# Patient Record
Sex: FEMALE | Race: OTHER | HISPANIC OR LATINO | URBAN - METROPOLITAN AREA
[De-identification: names, ages, dates, MRNs, and addresses within clinical notes are randomized per-mention and may not be internally consistent; named-entity substitution may affect disease eponyms.]

---

## 2020-12-09 ENCOUNTER — EMERGENCY (EMERGENCY)
Facility: HOSPITAL | Age: 35
LOS: 1 days | Discharge: HOME | End: 2020-12-09
Attending: EMERGENCY MEDICINE | Admitting: EMERGENCY MEDICINE
Payer: MEDICAID

## 2020-12-09 VITALS
RESPIRATION RATE: 18 BRPM | SYSTOLIC BLOOD PRESSURE: 101 MMHG | HEART RATE: 71 BPM | DIASTOLIC BLOOD PRESSURE: 56 MMHG | OXYGEN SATURATION: 95 % | TEMPERATURE: 98 F

## 2020-12-09 LAB
ADD ON TEST-SPECIMEN IN LAB: SIGNIFICANT CHANGE UP
ALBUMIN SERPL ELPH-MCNC: 4.4 G/DL — SIGNIFICANT CHANGE UP (ref 3.3–5)
ALP SERPL-CCNC: 72 U/L — SIGNIFICANT CHANGE UP (ref 40–120)
ALT FLD-CCNC: 17 U/L — SIGNIFICANT CHANGE UP (ref 4–33)
ANION GAP SERPL CALC-SCNC: 10 MMOL/L — SIGNIFICANT CHANGE UP (ref 7–14)
AST SERPL-CCNC: 18 U/L — SIGNIFICANT CHANGE UP (ref 4–32)
BASOPHILS # BLD AUTO: 0.01 K/UL — SIGNIFICANT CHANGE UP (ref 0–0.2)
BASOPHILS NFR BLD AUTO: 0.2 % — SIGNIFICANT CHANGE UP (ref 0–2)
BILIRUB SERPL-MCNC: 0.4 MG/DL — SIGNIFICANT CHANGE UP (ref 0.2–1.2)
BUN SERPL-MCNC: 10 MG/DL — SIGNIFICANT CHANGE UP (ref 7–23)
CALCIUM SERPL-MCNC: 9.5 MG/DL — SIGNIFICANT CHANGE UP (ref 8.4–10.5)
CHLORIDE SERPL-SCNC: 101 MMOL/L — SIGNIFICANT CHANGE UP (ref 98–107)
CO2 SERPL-SCNC: 25 MMOL/L — SIGNIFICANT CHANGE UP (ref 22–31)
CREAT SERPL-MCNC: 0.58 MG/DL — SIGNIFICANT CHANGE UP (ref 0.5–1.3)
EOSINOPHIL # BLD AUTO: 0.1 K/UL — SIGNIFICANT CHANGE UP (ref 0–0.5)
EOSINOPHIL NFR BLD AUTO: 1.6 % — SIGNIFICANT CHANGE UP (ref 0–6)
GLUCOSE SERPL-MCNC: 89 MG/DL — SIGNIFICANT CHANGE UP (ref 70–99)
HCT VFR BLD CALC: 38.4 % — SIGNIFICANT CHANGE UP (ref 34.5–45)
HGB BLD-MCNC: 12.7 G/DL — SIGNIFICANT CHANGE UP (ref 11.5–15.5)
IANC: 3.53 K/UL — SIGNIFICANT CHANGE UP (ref 1.5–8.5)
IMM GRANULOCYTES NFR BLD AUTO: 0.6 % — SIGNIFICANT CHANGE UP (ref 0–1.5)
LYMPHOCYTES # BLD AUTO: 1.9 K/UL — SIGNIFICANT CHANGE UP (ref 1–3.3)
LYMPHOCYTES # BLD AUTO: 30.5 % — SIGNIFICANT CHANGE UP (ref 13–44)
MCHC RBC-ENTMCNC: 28.7 PG — SIGNIFICANT CHANGE UP (ref 27–34)
MCHC RBC-ENTMCNC: 33.1 GM/DL — SIGNIFICANT CHANGE UP (ref 32–36)
MCV RBC AUTO: 86.7 FL — SIGNIFICANT CHANGE UP (ref 80–100)
MONOCYTES # BLD AUTO: 0.64 K/UL — SIGNIFICANT CHANGE UP (ref 0–0.9)
MONOCYTES NFR BLD AUTO: 10.3 % — SIGNIFICANT CHANGE UP (ref 2–14)
NEUTROPHILS # BLD AUTO: 3.53 K/UL — SIGNIFICANT CHANGE UP (ref 1.8–7.4)
NEUTROPHILS NFR BLD AUTO: 56.8 % — SIGNIFICANT CHANGE UP (ref 43–77)
NRBC # BLD: 0 /100 WBCS — SIGNIFICANT CHANGE UP
NRBC # FLD: 0 K/UL — SIGNIFICANT CHANGE UP
PLATELET # BLD AUTO: 232 K/UL — SIGNIFICANT CHANGE UP (ref 150–400)
POTASSIUM SERPL-MCNC: 4.3 MMOL/L — SIGNIFICANT CHANGE UP (ref 3.5–5.3)
POTASSIUM SERPL-SCNC: 4.3 MMOL/L — SIGNIFICANT CHANGE UP (ref 3.5–5.3)
PROT SERPL-MCNC: 7.8 G/DL — SIGNIFICANT CHANGE UP (ref 6–8.3)
RBC # BLD: 4.43 M/UL — SIGNIFICANT CHANGE UP (ref 3.8–5.2)
RBC # FLD: 13.3 % — SIGNIFICANT CHANGE UP (ref 10.3–14.5)
SARS-COV-2 RNA SPEC QL NAA+PROBE: SIGNIFICANT CHANGE UP
SODIUM SERPL-SCNC: 136 MMOL/L — SIGNIFICANT CHANGE UP (ref 135–145)
TROPONIN T, HIGH SENSITIVITY RESULT: <6 NG/L — SIGNIFICANT CHANGE UP
WBC # BLD: 6.22 K/UL — SIGNIFICANT CHANGE UP (ref 3.8–10.5)
WBC # FLD AUTO: 6.22 K/UL — SIGNIFICANT CHANGE UP (ref 3.8–10.5)

## 2020-12-09 PROCEDURE — 71046 X-RAY EXAM CHEST 2 VIEWS: CPT | Mod: 26

## 2020-12-09 PROCEDURE — 99284 EMERGENCY DEPT VISIT MOD MDM: CPT

## 2020-12-09 RX ORDER — FAMOTIDINE 10 MG/ML
20 INJECTION INTRAVENOUS ONCE
Refills: 0 | Status: COMPLETED | OUTPATIENT
Start: 2020-12-09 | End: 2020-12-09

## 2020-12-09 RX ORDER — FAMOTIDINE 10 MG/ML
20 INJECTION INTRAVENOUS ONCE
Refills: 0 | Status: DISCONTINUED | OUTPATIENT
Start: 2020-12-09 | End: 2020-12-09

## 2020-12-09 RX ORDER — SODIUM CHLORIDE 9 MG/ML
1000 INJECTION INTRAMUSCULAR; INTRAVENOUS; SUBCUTANEOUS ONCE
Refills: 0 | Status: COMPLETED | OUTPATIENT
Start: 2020-12-09 | End: 2020-12-09

## 2020-12-09 RX ORDER — ONDANSETRON 8 MG/1
4 TABLET, FILM COATED ORAL ONCE
Refills: 0 | Status: COMPLETED | OUTPATIENT
Start: 2020-12-09 | End: 2020-12-09

## 2020-12-09 RX ADMIN — SODIUM CHLORIDE 1000 MILLILITER(S): 9 INJECTION INTRAMUSCULAR; INTRAVENOUS; SUBCUTANEOUS at 13:24

## 2020-12-09 RX ADMIN — FAMOTIDINE 20 MILLIGRAM(S): 10 INJECTION INTRAVENOUS at 13:36

## 2020-12-09 RX ADMIN — ONDANSETRON 4 MILLIGRAM(S): 8 TABLET, FILM COATED ORAL at 13:36

## 2020-12-09 NOTE — ED PROVIDER NOTE - NSFOLLOWUPINSTRUCTIONS_ED_ALL_ED_FT
Rest, drink plenty of fluids.  Advance activity as tolerated.  Continue all previously prescribed medications as directed.  Follow up with your primary care physician in 48-72 hours- bring copies of your results.  Return to the ER for worsening or persistent symptoms, and/or ANY NEW OR CONCERNING SYMPTOMS. If you have issues obtaining follow up, please call: [phone] to obtain a doctor or specialist who takes your insurance in your area.  You may call [phone] to make an appointment with the internal medicine clinic.

## 2020-12-09 NOTE — ED PROVIDER NOTE - PHYSICAL EXAMINATION
GEN - NAD; well appearing; A+O x3   HEAD - NC/AT   EYES- PERRL, EOMI  ENT: Airway patent, mmm, Oral cavity and pharynx normal. No inflammation, swelling, exudate, or lesions.    NECK: Neck supple, non-tender without lymphadenopathy, no masses.  PULMONARY - CTA b/l, symmetric breath sounds.   CARDIAC -s1s2, RRR, no M,G,R  ABDOMEN - +BS, ND, NT, soft, no guarding, no rebound, no masses   BACK - no CVA tenderness, Normal  spine   EXTREMITIES - FROM,  no edema   SKIN - no rash or bruising   NEUROLOGIC - alert, speech clear, no focal deficits  PSYCH -nl mood/affect, nl insight.

## 2020-12-09 NOTE — ED PROVIDER NOTE - PROGRESS NOTE DETAILS
Pt advised that she possibly has covid- advised to quarantine for 2 weeks from symptoms onset and return if symptoms persist or worsen. Pt advised to take tylenol and motrin as need. Pt advised that she possibly has covid- advised to quarantine for 2 weeks from symptoms onset and return if symptoms persist or worsen. Pt advised to take tylenol and motrin as need. all results d/w her and copies given.

## 2020-12-09 NOTE — ED PROVIDER NOTE - PATIENT PORTAL LINK FT
You can access the FollowMyHealth Patient Portal offered by [facility] by registering at the following website: [email or web address]. By joining [employer]’s FollowMyHealth portal, you will also be able to view your health information using other applications (apps) compatible with our system.

## 2020-12-09 NOTE — ED PROVIDER NOTE - NS ED ROS FT
ROS:  GENERAL: No fever, no chills  EYES: no change in vision  HEENT: no trouble swallowing, no trouble speaking  CARDIAC: + chest pain  PULMONARY: no cough, + shortness of breath  GI: + abdominal pain, +nausea, no diarrhea, no constipation  : No dysuria, no frequency, no change in appearance, or odor of urine  SKIN: no rashes  NEURO: + headache now resolved, no weakness  MSK: No joint pain

## 2020-12-09 NOTE — ED PROVIDER NOTE - OBJECTIVE STATEMENT
35 y/o f presents to the ED with myalgias,  nuasea, constant chest and epigastric burning discomfort, sob, with previous ha. Patient states symptoms started approx 6d ago, initially was having generalized ha which has since resolved. Then started having burning pain to abdomen and chest, and sob all the time. Denies fevers, cough, vomiting, diarrhea, dysuria, le edema, recent travel. +rapid covid negative at . lmp 2 w ago

## 2020-12-09 NOTE — ED ADULT TRIAGE NOTE - CHIEF COMPLAINT QUOTE
pt reports HA chest pain abd "burning" since last week. saw PMD with no results. had rapid COVID at urgent care today which was negative. denies fever or chills. denies cough. pt states CP worsens with exertion as well as dry mouth

## 2020-12-09 NOTE — ED PROVIDER NOTE - CLINICAL SUMMARY MEDICAL DECISION MAKING FREE TEXT BOX
Patient presents to the ED with constellation of symptoms most likely c/w viral syndrome-locations, will check labs/cxr/ekg, covid pcr, symptomatic treatment, and reassess.

## 2021-03-24 NOTE — ED ADULT TRIAGE NOTE - BEFAST SPEECH PHRASE
[FreeTextEntry1] : CKD 3-- creat slt above baseline range of 1.2 -1.3 but has been in this range previously - k also up . This is likely du to addition of aldactone but appears to have worked well for BP control (and proteinuria also better controlled) \par reviewed lower K intake and will recheck lytes in one month \par If stable will cont regimen -- otherwise consider lower aldactone dose (likely to qod) and/ or add k binder -- discussed\par f/u 3 mos \par 
Yes

## 2022-10-21 NOTE — ED ADULT TRIAGE NOTE - BP NONINVASIVE DIASTOLIC (MM HG)
MEDICARE WELLNESS VISIT NOTE    HISTORY OF PRESENT ILLNESS:   Cyndee Cruz presents for her Subsequent Annual Medicare Wellness Visit.   She has complaints or concerns which include, please see separate note.      Patient Care Team:  Abi Aguirre DO as PCP - General (Family Practice)        Patient Active Problem List   Diagnosis   • Mixed hyperlipidemia   • Primary hypertension   • Spondylolisthesis   • Chronic acquired lymphedema   • Cystinuria (CMS/HCC)   • Primary osteoarthritis of both hands   • VI (obstructive sleep apnea)   • Mild intermittent asthma without complication   • Lumbar spondylosis         Past Medical History:   Diagnosis Date   • Chest pain 2022    was evaluated in 2022 inpt- mid sternal to right, nonradiating, woke up in pain and lasted 1 hour, no CP since then   • COVID-19 virus detected 2022    had cough, fever   • High cholesterol    • Hypertension    • Kidney disease     GFR 31   • Primary generalized (osteo)arthritis    • Sciatic nerve pain    • Sleep apnea     has CPAP         Past Surgical History:   Procedure Laterality Date   • Appendectomy     • Back surgery     •  delivery+postpartum care     •  section, low transverse     • Joint replacement     • Removal of tonsils,<12 y/o     • Total knee replacement Right          Social History     Tobacco Use   • Smoking status: Never Smoker   • Smokeless tobacco: Never Used   Vaping Use   • Vaping Use: never used   Substance Use Topics   • Alcohol use: Yes     Alcohol/week: 3.0 standard drinks     Types: 3 Glasses of wine per week   • Drug use: Yes     Types: Marijuana     Comment: 4 times weekly     Drug use:    Drug Use:    Yes                Special: Marijuana       Comment: 4 times weekly    Family History   Problem Relation Age of Onset   • Aneurysm Mother    • Stroke Mother    • Diabetes Father    • Myocardial Infarction Father    • Cancer, Lung Sister    • Heart disease Sister    • Heart disease  Sister    • Heart disease Sister    • Heart disease Brother    • Heart disease Sister    • Heart disease Sister        Current Outpatient Medications   Medication Sig Dispense Refill   • scopolamine (TRANSDERM_SCOP) 1 MG/3DAYS patch Place 1 patch onto the skin every 72 hours. 10 patch 0   • Fenofibrate 200 MG Cap TAKE 1 CAPSULE EVERY MORNING 90 capsule 1   • ezetimibe (ZETIA) 10 MG tablet TAKE 1 TABLET EVERY DAY 90 tablet 0   • montelukast (SINGULAIR) 10 MG tablet TAKE 1 TABLET EVERY EVENING 90 tablet 0   • lidocaine (LIDODERM) 5 % patch 1 patch as needed.     • atorvastatin (LIPITOR) 80 MG tablet TAKE 1 TABLET EVERY DAY (Patient taking differently: Take 80 mg by mouth every evening.) 90 tablet 0   • albuterol 108 (90 Base) MCG/ACT inhaler Inhale 2 puffs into the lungs every 4 hours as needed for Shortness of Breath or Wheezing.      • amLODIPine (NORVASC) 5 MG tablet Take 1 tablet by mouth daily. (Patient taking differently: Take 5 mg by mouth every evening.) 90 tablet 3   • Valacyclovir HCl 1000 MG Tab Take 1 tablet by mouth daily. (Patient taking differently: Take 1,000 mg by mouth every evening.) 90 tablet 0   • lisinopril (ZESTRIL) 20 MG tablet Take 20 mg by mouth every morning.      • Multiple Vitamins-Minerals (Multivitamin Women) Tab Take 1 tablet by mouth daily.      • VITAMIN D, CHOLECALCIFEROL, PO Take 1 tablet by mouth daily.      • Ascorbic Acid (vitamin C) 100 MG tablet Take 100 mg by mouth daily.     • Psyllium ([employer] NATURAL FIBER SUPPLEMENT PO) Take 1 capsule by mouth daily.      • TURMERIC PO Take 1 capsule by mouth daily.      • LYSINE PO Take 1 tablet by mouth daily.        No current facility-administered medications for this visit.        The following items on the Medicare Health Risk Assessment were found to be positive  1.) Do you have an Advance directive, living will, or power of  for health care document that contains your wishes for end of life care?: No     2.) Would you like  additional information on advance directives?: Yes     6 b.) How many servings of High Fiber / Whole Grain Foods to you have each day ( 1 serving = 1 cup cold cereal, 1/2 cup cooked cereal, 1 slice bread): 1 per day         Vision and Hearing screens: Both screenings were performed and reviewed    Advance Directive:   The patient has the following documents:  Power of  for Health Care    Cognitive/Functional Status: Mini-Cog performed with score of 5    Opioid Review: Cyndee is not taking opioid medications.    Recent PHQ 2/9 Score:    PHQ 2:  Date Adult PHQ 2 Score Adult PHQ 2 Interpretation   10/21/2022 0 No further screening needed       PHQ 9:       DEPRESSION ASSESSMENT/PLAN:  Depression screening is negative no further plan needed.     Body mass index is 30.46 kg/m².    BMI ASSESSMENT/PLAN:  Patient is overweight.    Journal food intake daily and Join health club        See orders.   See Patient Instructions section.   Return in about 3 months (around 1/21/2023) for recheck.     56

## 2022-12-16 ENCOUNTER — EMERGENCY (EMERGENCY)
Facility: HOSPITAL | Age: 37
LOS: 1 days | Discharge: HOME | End: 2022-12-16
Attending: STUDENT IN AN ORGANIZED HEALTH CARE EDUCATION/TRAINING PROGRAM | Admitting: STUDENT IN AN ORGANIZED HEALTH CARE EDUCATION/TRAINING PROGRAM
Payer: MEDICAID

## 2022-12-16 VITALS
TEMPERATURE: 98 F | HEART RATE: 74 BPM | RESPIRATION RATE: 18 BRPM | SYSTOLIC BLOOD PRESSURE: 100 MMHG | DIASTOLIC BLOOD PRESSURE: 66 MMHG | OXYGEN SATURATION: 98 %

## 2022-12-16 VITALS
HEART RATE: 87 BPM | WEIGHT: 141.1 LBS | DIASTOLIC BLOOD PRESSURE: 77 MMHG | OXYGEN SATURATION: 96 % | HEIGHT: 65 IN | SYSTOLIC BLOOD PRESSURE: 113 MMHG | RESPIRATION RATE: 20 BRPM | TEMPERATURE: 97 F

## 2022-12-16 LAB
ALBUMIN SERPL ELPH-MCNC: 3.7 G/DL — SIGNIFICANT CHANGE UP (ref 3.3–5)
ALP SERPL-CCNC: 97 U/L — SIGNIFICANT CHANGE UP (ref 30–120)
ALT FLD-CCNC: 29 U/L DA — SIGNIFICANT CHANGE UP (ref 10–60)
ANION GAP SERPL CALC-SCNC: 10 MMOL/L — SIGNIFICANT CHANGE UP (ref 5–17)
APPEARANCE UR: CLEAR — SIGNIFICANT CHANGE UP
AST SERPL-CCNC: 27 U/L — SIGNIFICANT CHANGE UP (ref 10–40)
BACTERIA # UR AUTO: NEGATIVE — SIGNIFICANT CHANGE UP
BASOPHILS # BLD AUTO: 0.02 K/UL — SIGNIFICANT CHANGE UP (ref 0–0.2)
BASOPHILS NFR BLD AUTO: 0.2 % — SIGNIFICANT CHANGE UP (ref 0–2)
BILIRUB SERPL-MCNC: 0.4 MG/DL — SIGNIFICANT CHANGE UP (ref 0.2–1.2)
BILIRUB UR-MCNC: ABNORMAL
BUN SERPL-MCNC: 13 MG/DL — SIGNIFICANT CHANGE UP (ref 7–23)
CALCIUM SERPL-MCNC: 8.6 MG/DL — SIGNIFICANT CHANGE UP (ref 8.4–10.5)
CHLORIDE SERPL-SCNC: 103 MMOL/L — SIGNIFICANT CHANGE UP (ref 96–108)
CO2 SERPL-SCNC: 25 MMOL/L — SIGNIFICANT CHANGE UP (ref 22–31)
COLOR SPEC: YELLOW — SIGNIFICANT CHANGE UP
CREAT SERPL-MCNC: 0.68 MG/DL — SIGNIFICANT CHANGE UP (ref 0.5–1.3)
DIFF PNL FLD: ABNORMAL
EGFR: 115 ML/MIN/1.73M2 — SIGNIFICANT CHANGE UP
EOSINOPHIL # BLD AUTO: 0.18 K/UL — SIGNIFICANT CHANGE UP (ref 0–0.5)
EOSINOPHIL NFR BLD AUTO: 2.1 % — SIGNIFICANT CHANGE UP (ref 0–6)
EPI CELLS # UR: SIGNIFICANT CHANGE UP
GLUCOSE SERPL-MCNC: 100 MG/DL — HIGH (ref 70–99)
GLUCOSE UR QL: NEGATIVE MG/DL — SIGNIFICANT CHANGE UP
HCG UR QL: NEGATIVE — SIGNIFICANT CHANGE UP
HCT VFR BLD CALC: 37.9 % — SIGNIFICANT CHANGE UP (ref 34.5–45)
HGB BLD-MCNC: 12.7 G/DL — SIGNIFICANT CHANGE UP (ref 11.5–15.5)
IMM GRANULOCYTES NFR BLD AUTO: 1 % — HIGH (ref 0–0.9)
KETONES UR-MCNC: NEGATIVE — SIGNIFICANT CHANGE UP
LEUKOCYTE ESTERASE UR-ACNC: ABNORMAL
LYMPHOCYTES # BLD AUTO: 2.59 K/UL — SIGNIFICANT CHANGE UP (ref 1–3.3)
LYMPHOCYTES # BLD AUTO: 30 % — SIGNIFICANT CHANGE UP (ref 13–44)
MCHC RBC-ENTMCNC: 29.4 PG — SIGNIFICANT CHANGE UP (ref 27–34)
MCHC RBC-ENTMCNC: 33.5 GM/DL — SIGNIFICANT CHANGE UP (ref 32–36)
MCV RBC AUTO: 87.7 FL — SIGNIFICANT CHANGE UP (ref 80–100)
MONOCYTES # BLD AUTO: 0.91 K/UL — HIGH (ref 0–0.9)
MONOCYTES NFR BLD AUTO: 10.5 % — SIGNIFICANT CHANGE UP (ref 2–14)
NEUTROPHILS # BLD AUTO: 4.84 K/UL — SIGNIFICANT CHANGE UP (ref 1.8–7.4)
NEUTROPHILS NFR BLD AUTO: 56.2 % — SIGNIFICANT CHANGE UP (ref 43–77)
NITRITE UR-MCNC: POSITIVE
NRBC # BLD: 0 /100 WBCS — SIGNIFICANT CHANGE UP (ref 0–0)
PH UR: 6.5 — SIGNIFICANT CHANGE UP (ref 5–8)
PLATELET # BLD AUTO: 170 K/UL — SIGNIFICANT CHANGE UP (ref 150–400)
POTASSIUM SERPL-MCNC: 3.7 MMOL/L — SIGNIFICANT CHANGE UP (ref 3.5–5.3)
POTASSIUM SERPL-SCNC: 3.7 MMOL/L — SIGNIFICANT CHANGE UP (ref 3.5–5.3)
PROT SERPL-MCNC: 7.7 G/DL — SIGNIFICANT CHANGE UP (ref 6–8.3)
PROT UR-MCNC: NEGATIVE MG/DL — SIGNIFICANT CHANGE UP
RBC # BLD: 4.32 M/UL — SIGNIFICANT CHANGE UP (ref 3.8–5.2)
RBC # FLD: 12.8 % — SIGNIFICANT CHANGE UP (ref 10.3–14.5)
RBC CASTS # UR COMP ASSIST: SIGNIFICANT CHANGE UP /HPF (ref 0–4)
SODIUM SERPL-SCNC: 138 MMOL/L — SIGNIFICANT CHANGE UP (ref 135–145)
SP GR SPEC: 1.01 — SIGNIFICANT CHANGE UP (ref 1.01–1.02)
UROBILINOGEN FLD QL: 4 MG/DL
WBC # BLD: 8.63 K/UL — SIGNIFICANT CHANGE UP (ref 3.8–10.5)
WBC # FLD AUTO: 8.63 K/UL — SIGNIFICANT CHANGE UP (ref 3.8–10.5)
WBC UR QL: SIGNIFICANT CHANGE UP

## 2022-12-16 PROCEDURE — 96375 TX/PRO/DX INJ NEW DRUG ADDON: CPT

## 2022-12-16 PROCEDURE — 36415 COLL VENOUS BLD VENIPUNCTURE: CPT

## 2022-12-16 PROCEDURE — 96365 THER/PROPH/DIAG IV INF INIT: CPT

## 2022-12-16 PROCEDURE — 85025 COMPLETE CBC W/AUTO DIFF WBC: CPT

## 2022-12-16 PROCEDURE — 99284 EMERGENCY DEPT VISIT MOD MDM: CPT

## 2022-12-16 PROCEDURE — 80053 COMPREHEN METABOLIC PANEL: CPT

## 2022-12-16 PROCEDURE — 81001 URINALYSIS AUTO W/SCOPE: CPT

## 2022-12-16 PROCEDURE — 87086 URINE CULTURE/COLONY COUNT: CPT

## 2022-12-16 PROCEDURE — 99284 EMERGENCY DEPT VISIT MOD MDM: CPT | Mod: 25

## 2022-12-16 PROCEDURE — 81025 URINE PREGNANCY TEST: CPT

## 2022-12-16 RX ORDER — CEFUROXIME AXETIL 250 MG
1 TABLET ORAL
Qty: 14 | Refills: 0
Start: 2022-12-16 | End: 2022-12-22

## 2022-12-16 RX ORDER — CEFUROXIME AXETIL 250 MG
500 TABLET ORAL ONCE
Refills: 0 | Status: COMPLETED | OUTPATIENT
Start: 2022-12-16 | End: 2022-12-16

## 2022-12-16 RX ORDER — CEFTRIAXONE 500 MG/1
1000 INJECTION, POWDER, FOR SOLUTION INTRAMUSCULAR; INTRAVENOUS ONCE
Refills: 0 | Status: COMPLETED | OUTPATIENT
Start: 2022-12-16 | End: 2022-12-16

## 2022-12-16 RX ORDER — SODIUM CHLORIDE 9 MG/ML
1000 INJECTION INTRAMUSCULAR; INTRAVENOUS; SUBCUTANEOUS ONCE
Refills: 0 | Status: COMPLETED | OUTPATIENT
Start: 2022-12-16 | End: 2022-12-16

## 2022-12-16 RX ORDER — KETOROLAC TROMETHAMINE 30 MG/ML
15 SYRINGE (ML) INJECTION ONCE
Refills: 0 | Status: DISCONTINUED | OUTPATIENT
Start: 2022-12-16 | End: 2022-12-16

## 2022-12-16 RX ADMIN — Medication 15 MILLIGRAM(S): at 20:24

## 2022-12-16 RX ADMIN — CEFTRIAXONE 1000 MILLIGRAM(S): 500 INJECTION, POWDER, FOR SOLUTION INTRAMUSCULAR; INTRAVENOUS at 20:39

## 2022-12-16 RX ADMIN — Medication 500 MILLIGRAM(S): at 21:30

## 2022-12-16 RX ADMIN — CEFTRIAXONE 100 MILLIGRAM(S): 500 INJECTION, POWDER, FOR SOLUTION INTRAMUSCULAR; INTRAVENOUS at 20:09

## 2022-12-16 RX ADMIN — SODIUM CHLORIDE 1000 MILLILITER(S): 9 INJECTION INTRAMUSCULAR; INTRAVENOUS; SUBCUTANEOUS at 21:09

## 2022-12-16 RX ADMIN — SODIUM CHLORIDE 1000 MILLILITER(S): 9 INJECTION INTRAMUSCULAR; INTRAVENOUS; SUBCUTANEOUS at 20:09

## 2022-12-16 RX ADMIN — Medication 15 MILLIGRAM(S): at 20:09

## 2022-12-16 NOTE — ED ADULT NURSE NOTE - OBJECTIVE STATEMENT
36 YOF A&OX3 presents for back pain. pt states has right sided flank pain on and off for several weeks. pt is taking UTI medication pyridium without relief. pt rates pain 8/10. pt denies fevers/chills, n/v/d. safety maintained.

## 2022-12-16 NOTE — ED PROVIDER NOTE - NS ED ATTENDING STATEMENT MOD
This was a shared visit with the GINA. I reviewed and verified the documentation and independently performed the documented:

## 2022-12-16 NOTE — ED PROVIDER NOTE - NSFOLLOWUPINSTRUCTIONS_ED_ALL_ED_FT
Drink plenty of fluids.  Cefuroxime twice a day.  Motrin for pain.  Follow up with your medical doctor.  Return for worsening or concerning symptoms.        A urinary tract infection (UTI) is an infection of any part of the urinary tract. The urinary tract includes:  •The kidneys.      •The ureters.      •The bladder.      •The urethra.      These organs make, store, and get rid of pee (urine) in the body.      What are the causes?    This infection is caused by germs (bacteria) in your genital area. These germs grow and cause swelling (inflammation) of your urinary tract.      What increases the risk?    The following factors may make you more likely to develop this condition:  •Using a small, thin tube (catheter) to drain pee.      •Not being able to control when you pee or poop (incontinence).    •Being female. If you are female, these things can increase the risk:•Using these methods to prevent pregnancy:  •A medicine that kills sperm (spermicide).      •A device that blocks sperm (diaphragm).        •Having low levels of a female hormone (estrogen).      •Being pregnant.        You are more likely to develop this condition if:  •You have genes that add to your risk.      •You are sexually active.      •You take antibiotic medicines.     •You have trouble peeing because of:  •A prostate that is bigger than normal, if you are male.      •A blockage in the part of your body that drains pee from the bladder.      •A kidney stone.       •A nerve condition that affects your bladder.      •Not getting enough to drink.       •Not peeing often enough.      •You have other conditions, such as:  •Diabetes.       •A weak disease-fighting system (immune system).      •Sickle cell disease.       •Gout.       •Injury of the spine.          What are the signs or symptoms?    Symptoms of this condition include:  •Needing to pee right away.      •Peeing small amounts often.      •Pain or burning when peeing.      •Blood in the pee.      •Pee that smells bad or not like normal.      •Trouble peeing.      •Pee that is cloudy.      •Fluid coming from the vagina, if you are female.      •Pain in the belly or lower back.      Other symptoms include:  •Vomiting.      •Not feeling hungry.      •Feeling mixed up (confused). This may be the first symptom in older adults.      •Being tired and grouchy (irritable).      •A fever.      •Watery poop (diarrhea).        How is this treated?    •Taking antibiotic medicine.      •Taking other medicines.      •Drinking enough water.      In some cases, you may need to see a specialist.      Follow these instructions at home:     Medicines     •Take over-the-counter and prescription medicines only as told by your doctor.      •If you were prescribed an antibiotic medicine, take it as told by your doctor. Do not stop taking it even if you start to feel better.      General instructions   •Make sure you:  •Pee until your bladder is empty.       •Do not hold pee for a long time.      •Empty your bladder after sex.      •Wipe from front to back after peeing or pooping if you are a female. Use each tissue one time when you wipe.        •Drink enough fluid to keep your pee pale yellow.      •Keep all follow-up visits.        Contact a doctor if:    •You do not get better after 1–2 days.      •Your symptoms go away and then come back.        Get help right away if:    •You have very bad back pain.      •You have very bad pain in your lower belly.      •You have a fever.      •You have chills.      •You feeling like you will vomit or you vomit.        Summary    •A urinary tract infection (UTI) is an infection of any part of the urinary tract.      •This condition is caused by germs in your genital area.      •There are many risk factors for a UTI.      •Treatment includes antibiotic medicines.      •Drink enough fluid to keep your pee pale yellow.      This information is not intended to replace advice given to you by your health care provider. Make sure you discuss any questions you have with your health care provider.

## 2022-12-16 NOTE — ED PROVIDER NOTE - CLINICAL SUMMARY MEDICAL DECISION MAKING FREE TEXT BOX
36 year old female p/w right flank pain, suprapubic pain.  No v/d/f/dysuria.  Patient in no acute distress.  Check labs, UA, UCx, hydrate, analgesia, abx as needed. Consider CT if high suspicion of renal colic or pyelonephritis

## 2022-12-16 NOTE — ED PROVIDER NOTE - PATIENT PORTAL LINK FT
You can access the FollowMyHealth Patient Portal offered by [facility] by registering at the following website: [email or web address]. By joining [employer]’s FollowMyHealth portal, you will also be able to view your health information using other applications (apps) compatible with our system.

## 2022-12-16 NOTE — ED PROVIDER NOTE - TEST CONSIDERED BUT NOT PERFORMED
Form completed and sent to Physician's Office via inter office for review and signature on 5/7/18 pm round.   Tests Considered But Not Performed CT renal stone jacome

## 2022-12-16 NOTE — ED PROVIDER NOTE - CARE PROVIDER_API CALL
Trevor Harden)  Internal Medicine  [address] [place] [address]  Phone: [phone]  Fax: [phone]  Follow Up Time:

## 2022-12-16 NOTE — ED PROVIDER NOTE - ATTENDING APP SHARED VISIT CONTRIBUTION OF CARE
36 year old female with a history of hyperthyroid presents with right flank pain x 2 weeks.  Patient states she initially only had the pain upon waking up in the morning. It would improve as the day progressed and patient increased her water intake.  4 days ago, she developed full b/l low back pain. 2 days ago, the pain was present only on the right flank and had become more intense.  It is associated with mild suprapubic pain. She has been taking Tylenol with mild relief, last dose 3 days ago. She also took OTC Azo today.  Denies f/v/d/dysuria, hematuria. Denies trauma or heavy lifting. PMD Dr. Murray    AO x 3 in NAD  RRR, S1S2  Lungs CTA b/l  Abd soft, NT/ND, no rebound, no guarding, +BS, no CVA tenderness, no pulsatile mass  No LE edema  No focal neuro deficits

## 2022-12-16 NOTE — ED PROVIDER NOTE - OBJECTIVE STATEMENT
36 F hx hypothyroidism c/o intermittent right flank pain x weeks. States started a few weeks back, increased hydration, improved. But has had intermittent pain since then, worse the past few days. Took an Azo pill today. Hx UTIs but no prior hx of similar symptoms. Currently without urinary complaints. C/o right flank and suprapubic pain. Denies f/c, n/v.

## 2022-12-16 NOTE — ED ADULT NURSE NOTE - PRIVACY CONCERNS
No Purpose of the nutrition education/Priority modifications/Nutrition relationship to health/disease/Recommended modifications/Pt educated on postpartum dietary recommendations including: risk of development of T2DM, ways to reduce risk of developing T2DM and reinforced importance of DM screening 4-12 weeks postpartum. Pt verbalized good understanding. Written materials left at bedside.

## 2022-12-16 NOTE — ED ADULT TRIAGE NOTE - CHIEF COMPLAINT QUOTE
c/o "right side back/flank pain on and off for weeks". taking UTI meds w/out relief. hx UTI, but denies symptoms.

## 2022-12-17 DIAGNOSIS — Z90.49 ACQUIRED ABSENCE OF OTHER SPECIFIED PARTS OF DIGESTIVE TRACT: Chronic | ICD-10-CM

## 2022-12-18 LAB
CULTURE RESULTS: SIGNIFICANT CHANGE UP
SPECIMEN SOURCE: SIGNIFICANT CHANGE UP

## 2023-01-08 ENCOUNTER — EMERGENCY (EMERGENCY)
Facility: HOSPITAL | Age: 38
LOS: 1 days | Discharge: HOME | End: 2023-01-08
Attending: EMERGENCY MEDICINE | Admitting: EMERGENCY MEDICINE
Payer: MEDICAID

## 2023-01-08 VITALS
OXYGEN SATURATION: 98 % | RESPIRATION RATE: 20 BRPM | HEIGHT: 65 IN | WEIGHT: 141.98 LBS | DIASTOLIC BLOOD PRESSURE: 65 MMHG | HEART RATE: 80 BPM | TEMPERATURE: 98 F | SYSTOLIC BLOOD PRESSURE: 97 MMHG

## 2023-01-08 VITALS
SYSTOLIC BLOOD PRESSURE: 94 MMHG | HEART RATE: 72 BPM | TEMPERATURE: 98 F | RESPIRATION RATE: 20 BRPM | OXYGEN SATURATION: 98 % | DIASTOLIC BLOOD PRESSURE: 620 MMHG

## 2023-01-08 DIAGNOSIS — Z90.49 ACQUIRED ABSENCE OF OTHER SPECIFIED PARTS OF DIGESTIVE TRACT: Chronic | ICD-10-CM

## 2023-01-08 PROBLEM — E03.9 HYPOTHYROIDISM, UNSPECIFIED: Chronic | Status: ACTIVE | Noted: 2022-12-16

## 2023-01-08 LAB
ALBUMIN SERPL ELPH-MCNC: 3.7 G/DL — SIGNIFICANT CHANGE UP (ref 3.3–5)
ALP SERPL-CCNC: 118 U/L — SIGNIFICANT CHANGE UP (ref 30–120)
ALT FLD-CCNC: 29 U/L DA — SIGNIFICANT CHANGE UP (ref 10–60)
ANION GAP SERPL CALC-SCNC: 17 MMOL/L — SIGNIFICANT CHANGE UP (ref 5–17)
APPEARANCE UR: CLEAR — SIGNIFICANT CHANGE UP
AST SERPL-CCNC: 25 U/L — SIGNIFICANT CHANGE UP (ref 10–40)
BASOPHILS # BLD AUTO: 0.03 K/UL — SIGNIFICANT CHANGE UP (ref 0–0.2)
BASOPHILS NFR BLD AUTO: 0.4 % — SIGNIFICANT CHANGE UP (ref 0–2)
BILIRUB SERPL-MCNC: 0.3 MG/DL — SIGNIFICANT CHANGE UP (ref 0.2–1.2)
BILIRUB UR-MCNC: NEGATIVE — SIGNIFICANT CHANGE UP
BUN SERPL-MCNC: 10 MG/DL — SIGNIFICANT CHANGE UP (ref 7–23)
CALCIUM SERPL-MCNC: 8.6 MG/DL — SIGNIFICANT CHANGE UP (ref 8.4–10.5)
CHLORIDE SERPL-SCNC: 102 MMOL/L — SIGNIFICANT CHANGE UP (ref 96–108)
CO2 SERPL-SCNC: 19 MMOL/L — LOW (ref 22–31)
COLOR SPEC: YELLOW — SIGNIFICANT CHANGE UP
CREAT SERPL-MCNC: 0.56 MG/DL — SIGNIFICANT CHANGE UP (ref 0.5–1.3)
DIFF PNL FLD: ABNORMAL
EGFR: 120 ML/MIN/1.73M2 — SIGNIFICANT CHANGE UP
EOSINOPHIL # BLD AUTO: 0.15 K/UL — SIGNIFICANT CHANGE UP (ref 0–0.5)
EOSINOPHIL NFR BLD AUTO: 1.8 % — SIGNIFICANT CHANGE UP (ref 0–6)
GLUCOSE SERPL-MCNC: 119 MG/DL — HIGH (ref 70–99)
GLUCOSE UR QL: NEGATIVE MG/DL — SIGNIFICANT CHANGE UP
HCG UR QL: NEGATIVE — SIGNIFICANT CHANGE UP
HCT VFR BLD CALC: 37.2 % — SIGNIFICANT CHANGE UP (ref 34.5–45)
HGB BLD-MCNC: 12.6 G/DL — SIGNIFICANT CHANGE UP (ref 11.5–15.5)
IMM GRANULOCYTES NFR BLD AUTO: 0.5 % — SIGNIFICANT CHANGE UP (ref 0–0.9)
KETONES UR-MCNC: NEGATIVE — SIGNIFICANT CHANGE UP
LEUKOCYTE ESTERASE UR-ACNC: ABNORMAL
LIDOCAIN IGE QN: 135 U/L — SIGNIFICANT CHANGE UP (ref 73–393)
LYMPHOCYTES # BLD AUTO: 1.83 K/UL — SIGNIFICANT CHANGE UP (ref 1–3.3)
LYMPHOCYTES # BLD AUTO: 22.3 % — SIGNIFICANT CHANGE UP (ref 13–44)
MCHC RBC-ENTMCNC: 29.6 PG — SIGNIFICANT CHANGE UP (ref 27–34)
MCHC RBC-ENTMCNC: 33.9 GM/DL — SIGNIFICANT CHANGE UP (ref 32–36)
MCV RBC AUTO: 87.3 FL — SIGNIFICANT CHANGE UP (ref 80–100)
MONOCYTES # BLD AUTO: 0.69 K/UL — SIGNIFICANT CHANGE UP (ref 0–0.9)
MONOCYTES NFR BLD AUTO: 8.4 % — SIGNIFICANT CHANGE UP (ref 2–14)
NEUTROPHILS # BLD AUTO: 5.46 K/UL — SIGNIFICANT CHANGE UP (ref 1.8–7.4)
NEUTROPHILS NFR BLD AUTO: 66.6 % — SIGNIFICANT CHANGE UP (ref 43–77)
NITRITE UR-MCNC: NEGATIVE — SIGNIFICANT CHANGE UP
NRBC # BLD: 0 /100 WBCS — SIGNIFICANT CHANGE UP (ref 0–0)
PH UR: 6.5 — SIGNIFICANT CHANGE UP (ref 5–8)
PLATELET # BLD AUTO: 158 K/UL — SIGNIFICANT CHANGE UP (ref 150–400)
POTASSIUM SERPL-MCNC: 3.8 MMOL/L — SIGNIFICANT CHANGE UP (ref 3.5–5.3)
POTASSIUM SERPL-SCNC: 3.8 MMOL/L — SIGNIFICANT CHANGE UP (ref 3.5–5.3)
PROT SERPL-MCNC: 7.7 G/DL — SIGNIFICANT CHANGE UP (ref 6–8.3)
PROT UR-MCNC: NEGATIVE MG/DL — SIGNIFICANT CHANGE UP
RBC # BLD: 4.26 M/UL — SIGNIFICANT CHANGE UP (ref 3.8–5.2)
RBC # FLD: 12.8 % — SIGNIFICANT CHANGE UP (ref 10.3–14.5)
SODIUM SERPL-SCNC: 138 MMOL/L — SIGNIFICANT CHANGE UP (ref 135–145)
SP GR SPEC: 1.01 — SIGNIFICANT CHANGE UP (ref 1.01–1.02)
UROBILINOGEN FLD QL: NEGATIVE MG/DL — SIGNIFICANT CHANGE UP
WBC # BLD: 8.2 K/UL — SIGNIFICANT CHANGE UP (ref 3.8–10.5)
WBC # FLD AUTO: 8.2 K/UL — SIGNIFICANT CHANGE UP (ref 3.8–10.5)

## 2023-01-08 PROCEDURE — 80053 COMPREHEN METABOLIC PANEL: CPT

## 2023-01-08 PROCEDURE — 83690 ASSAY OF LIPASE: CPT

## 2023-01-08 PROCEDURE — 85025 COMPLETE CBC W/AUTO DIFF WBC: CPT

## 2023-01-08 PROCEDURE — 74176 CT ABD & PELVIS W/O CONTRAST: CPT | Mod: MA

## 2023-01-08 PROCEDURE — 36415 COLL VENOUS BLD VENIPUNCTURE: CPT

## 2023-01-08 PROCEDURE — 74176 CT ABD & PELVIS W/O CONTRAST: CPT | Mod: 26,MA

## 2023-01-08 PROCEDURE — 81025 URINE PREGNANCY TEST: CPT

## 2023-01-08 PROCEDURE — 99284 EMERGENCY DEPT VISIT MOD MDM: CPT

## 2023-01-08 PROCEDURE — 81001 URINALYSIS AUTO W/SCOPE: CPT

## 2023-01-08 RX ORDER — SODIUM CHLORIDE 9 MG/ML
1000 INJECTION INTRAMUSCULAR; INTRAVENOUS; SUBCUTANEOUS ONCE
Refills: 0 | Status: COMPLETED | OUTPATIENT
Start: 2023-01-08 | End: 2023-01-08

## 2023-01-08 RX ORDER — ONDANSETRON 8 MG/1
4 TABLET, FILM COATED ORAL ONCE
Refills: 0 | Status: DISCONTINUED | OUTPATIENT
Start: 2023-01-08 | End: 2023-01-11

## 2023-01-08 RX ADMIN — SODIUM CHLORIDE 1000 MILLILITER(S): 9 INJECTION INTRAMUSCULAR; INTRAVENOUS; SUBCUTANEOUS at 04:00

## 2023-01-08 NOTE — ED PROVIDER NOTE - OBJECTIVE STATEMENT
37 y.o. F c/o right flank pain, pain started weeks ago, mostly has been upper flank, intermittent, but now for a couple of days feels it lower flank, +nausea, no vomiting, no fever/chills, no change in BM, no urinary symptoms, was told a few weeks ago she had a mild uti and took antibiotics for a week (culture was negative),

## 2023-01-08 NOTE — ED PROVIDER NOTE - PATIENT PORTAL LINK FT
You can access the FollowMyHealth Patient Portal offered by [facility] by registering at the following website: [email or web address]. By joining [email or web address]’s FollowMyHealth portal, you will also be able to view your health information using other applications (apps) compatible with our system.

## 2023-01-08 NOTE — ED PROVIDER NOTE - CLINICAL SUMMARY MEDICAL DECISION MAKING FREE TEXT BOX
right flank pain - dxd uti a few wks ago, took abx, cx was negative - continued pain - iv, labs, ct, pain medication

## 2023-01-08 NOTE — ED PROVIDER NOTE - NSFOLLOWUPINSTRUCTIONS_ED_ALL_ED_FT
Flank Pain, Adult      Flank pain is pain that is located on the side of the body between the upper abdomen and the spine. This area is called the flank. The pain may occur over a short period of time (acute), or it may be long-term or recurring (chronic). It may be mild or severe. Flank pain can be caused by many things, including:  •Muscle soreness or injury.      •Kidney infection, kidney stones, or kidney disease.      •Stress.      •A disease of the spine (vertebral disk disease).      •A lung infection (pneumonia).      •Fluid around the lungs (pulmonary edema).      •A skin rash caused by the chickenpox virus (shingles).      •Tumors that affect the back of the abdomen.      •Gallbladder disease.        Follow these instructions at home:  A comparison of three sample cups showing dark yellow, yellow, and pale yellow urine.   •Drink enough fluid to keep your urine pale yellow.      •Rest as told by your health care provider.      •Take over-the-counter and prescription medicines only as told by your health care provider.      •Keep a journal to track what has caused your flank pain and what has made it feel better.      •Keep all follow-up visits. This is important.        Contact a health care provider if:    •Your pain is not controlled with medicine.      •You have new symptoms.      •Your pain gets worse.      •Your symptoms last longer than 2–3 days.      •You have trouble urinating or you are urinating very frequently.        Get help right away if:    •You have trouble breathing or you are short of breath.      •Your abdomen hurts or it is swollen or red.      •You have nausea or vomiting.      •You feel faint, or you faint.      •You have blood in your urine.      •You have flank pain and a fever.      These symptoms may represent a serious problem that is an emergency. Do not wait to see if the symptoms will go away. Get medical help right away. Call your local emergency services (911 in the [place] Do not drive yourself to the hospital.       Summary    •Flank pain is pain that is located on the side of the body between the upper abdomen and the spine.      •The pain may occur over a short period of time (acute), or it may be long-term or recurring (chronic). It may be mild or severe.      •Flank pain can be caused by many things.      •Contact your health care provider if your symptoms get worse or last longer than 2–3 days.      This information is not intended to replace advice given to you by your health care provider. Make sure you discuss any questions you have with your health care provider.

## 2023-02-13 NOTE — ED ADULT NURSE NOTE - CHIEF COMPLAINT QUOTE
[FreeTextEntry1] : CI removed\par \par rx omeprazole, mupirocin\par warm soaks to left nostril\par \par f/u prn c/o right flank pain on and off for weeks. dx w/ UTI weeks ago. finished po AB.

## 2023-04-24 ENCOUNTER — INPATIENT (INPATIENT)
Facility: HOSPITAL | Age: 38
LOS: 4 days | Discharge: HOME | DRG: 330 | End: 2023-04-29
Attending: INTERNAL MEDICINE | Admitting: INTERNAL MEDICINE
Payer: MEDICAID

## 2023-04-24 VITALS
DIASTOLIC BLOOD PRESSURE: 66 MMHG | WEIGHT: 139.99 LBS | HEART RATE: 76 BPM | HEIGHT: 61 IN | SYSTOLIC BLOOD PRESSURE: 103 MMHG | TEMPERATURE: 98 F | RESPIRATION RATE: 14 BRPM | OXYGEN SATURATION: 100 %

## 2023-04-24 DIAGNOSIS — Z90.49 ACQUIRED ABSENCE OF OTHER SPECIFIED PARTS OF DIGESTIVE TRACT: Chronic | ICD-10-CM

## 2023-04-24 DIAGNOSIS — Z90.49 ACQUIRED ABSENCE OF OTHER SPECIFIED PARTS OF DIGESTIVE TRACT: ICD-10-CM

## 2023-04-24 LAB
ALBUMIN SERPL ELPH-MCNC: 4.6 G/DL — SIGNIFICANT CHANGE UP (ref 3.3–5)
ALP SERPL-CCNC: 106 U/L — SIGNIFICANT CHANGE UP (ref 30–120)
ALT FLD-CCNC: 62 U/L DA — HIGH (ref 10–60)
ANION GAP SERPL CALC-SCNC: 14 MMOL/L — SIGNIFICANT CHANGE UP (ref 5–17)
APPEARANCE UR: CLEAR — SIGNIFICANT CHANGE UP
APTT BLD: 30.3 SEC — SIGNIFICANT CHANGE UP (ref 27.5–35.5)
AST SERPL-CCNC: 37 U/L — SIGNIFICANT CHANGE UP (ref 10–40)
BACTERIA # UR AUTO: NEGATIVE — SIGNIFICANT CHANGE UP
BASOPHILS # BLD AUTO: 0.02 K/UL — SIGNIFICANT CHANGE UP (ref 0–0.2)
BASOPHILS NFR BLD AUTO: 0.1 % — SIGNIFICANT CHANGE UP (ref 0–2)
BILIRUB SERPL-MCNC: 0.7 MG/DL — SIGNIFICANT CHANGE UP (ref 0.2–1.2)
BILIRUB UR-MCNC: NEGATIVE — SIGNIFICANT CHANGE UP
BLD GP AB SCN SERPL QL: SIGNIFICANT CHANGE UP
BUN SERPL-MCNC: 14 MG/DL — SIGNIFICANT CHANGE UP (ref 7–23)
CALCIUM SERPL-MCNC: 10.1 MG/DL — SIGNIFICANT CHANGE UP (ref 8.4–10.5)
CHLORIDE SERPL-SCNC: 100 MMOL/L — SIGNIFICANT CHANGE UP (ref 96–108)
CO2 SERPL-SCNC: 22 MMOL/L — SIGNIFICANT CHANGE UP (ref 22–31)
COLOR SPEC: YELLOW — SIGNIFICANT CHANGE UP
CREAT SERPL-MCNC: 0.69 MG/DL — SIGNIFICANT CHANGE UP (ref 0.5–1.3)
DIFF PNL FLD: ABNORMAL
EGFR: 114 ML/MIN/1.73M2 — SIGNIFICANT CHANGE UP
EOSINOPHIL # BLD AUTO: 0.02 K/UL — SIGNIFICANT CHANGE UP (ref 0–0.5)
EOSINOPHIL NFR BLD AUTO: 0.1 % — SIGNIFICANT CHANGE UP (ref 0–6)
EPI CELLS # UR: SIGNIFICANT CHANGE UP
GLUCOSE SERPL-MCNC: 139 MG/DL — HIGH (ref 70–99)
GLUCOSE UR QL: NEGATIVE MG/DL — SIGNIFICANT CHANGE UP
HCG UR QL: NEGATIVE — SIGNIFICANT CHANGE UP
HCT VFR BLD CALC: 42.6 % — SIGNIFICANT CHANGE UP (ref 34.5–45)
HGB BLD-MCNC: 14.4 G/DL — SIGNIFICANT CHANGE UP (ref 11.5–15.5)
IMM GRANULOCYTES NFR BLD AUTO: 0.4 % — SIGNIFICANT CHANGE UP (ref 0–0.9)
INR BLD: 1.03 RATIO — SIGNIFICANT CHANGE UP (ref 0.88–1.16)
KETONES UR-MCNC: ABNORMAL
LEUKOCYTE ESTERASE UR-ACNC: NEGATIVE — SIGNIFICANT CHANGE UP
LIDOCAIN IGE QN: 49 U/L — LOW (ref 73–393)
LYMPHOCYTES # BLD AUTO: 1.56 K/UL — SIGNIFICANT CHANGE UP (ref 1–3.3)
LYMPHOCYTES # BLD AUTO: 11.3 % — LOW (ref 13–44)
MCHC RBC-ENTMCNC: 28.7 PG — SIGNIFICANT CHANGE UP (ref 27–34)
MCHC RBC-ENTMCNC: 33.8 GM/DL — SIGNIFICANT CHANGE UP (ref 32–36)
MCV RBC AUTO: 84.9 FL — SIGNIFICANT CHANGE UP (ref 80–100)
MONOCYTES # BLD AUTO: 0.51 K/UL — SIGNIFICANT CHANGE UP (ref 0–0.9)
MONOCYTES NFR BLD AUTO: 3.7 % — SIGNIFICANT CHANGE UP (ref 2–14)
NEUTROPHILS # BLD AUTO: 11.61 K/UL — HIGH (ref 1.8–7.4)
NEUTROPHILS NFR BLD AUTO: 84.4 % — HIGH (ref 43–77)
NITRITE UR-MCNC: NEGATIVE — SIGNIFICANT CHANGE UP
NRBC # BLD: 0 /100 WBCS — SIGNIFICANT CHANGE UP (ref 0–0)
PH UR: 8 — SIGNIFICANT CHANGE UP (ref 5–8)
PLATELET # BLD AUTO: 207 K/UL — SIGNIFICANT CHANGE UP (ref 150–400)
POTASSIUM SERPL-MCNC: 3.9 MMOL/L — SIGNIFICANT CHANGE UP (ref 3.5–5.3)
POTASSIUM SERPL-SCNC: 3.9 MMOL/L — SIGNIFICANT CHANGE UP (ref 3.5–5.3)
PROT SERPL-MCNC: 8.9 G/DL — HIGH (ref 6–8.3)
PROT UR-MCNC: 30 MG/DL
PROTHROM AB SERPL-ACNC: 12.2 SEC — SIGNIFICANT CHANGE UP (ref 10.5–13.4)
RBC # BLD: 5.02 M/UL — SIGNIFICANT CHANGE UP (ref 3.8–5.2)
RBC # FLD: 13.4 % — SIGNIFICANT CHANGE UP (ref 10.3–14.5)
RBC CASTS # UR COMP ASSIST: SIGNIFICANT CHANGE UP /HPF (ref 0–4)
SODIUM SERPL-SCNC: 136 MMOL/L — SIGNIFICANT CHANGE UP (ref 135–145)
SP GR SPEC: 1 — LOW (ref 1.01–1.02)
UROBILINOGEN FLD QL: NEGATIVE MG/DL — SIGNIFICANT CHANGE UP
WBC # BLD: 13.78 K/UL — HIGH (ref 3.8–10.5)
WBC # FLD AUTO: 13.78 K/UL — HIGH (ref 3.8–10.5)
WBC UR QL: SIGNIFICANT CHANGE UP

## 2023-04-24 PROCEDURE — 99285 EMERGENCY DEPT VISIT HI MDM: CPT

## 2023-04-24 PROCEDURE — 74177 CT ABD & PELVIS W/CONTRAST: CPT | Mod: 26,MA

## 2023-04-24 RX ORDER — ENOXAPARIN SODIUM 100 MG/ML
40 INJECTION SUBCUTANEOUS EVERY 24 HOURS
Refills: 0 | Status: DISCONTINUED | OUTPATIENT
Start: 2023-04-24 | End: 2023-04-26

## 2023-04-24 RX ORDER — SODIUM CHLORIDE 9 MG/ML
1000 INJECTION, SOLUTION INTRAVENOUS
Refills: 0 | Status: DISCONTINUED | OUTPATIENT
Start: 2023-04-24 | End: 2023-04-25

## 2023-04-24 RX ORDER — MORPHINE SULFATE 50 MG/1
4 CAPSULE, EXTENDED RELEASE ORAL ONCE
Refills: 0 | Status: DISCONTINUED | OUTPATIENT
Start: 2023-04-24 | End: 2023-04-24

## 2023-04-24 RX ORDER — FAMOTIDINE 10 MG/ML
20 INJECTION INTRAVENOUS ONCE
Refills: 0 | Status: COMPLETED | OUTPATIENT
Start: 2023-04-24 | End: 2023-04-24

## 2023-04-24 RX ORDER — PANTOPRAZOLE SODIUM 20 MG/1
40 TABLET, DELAYED RELEASE ORAL EVERY 24 HOURS
Refills: 0 | Status: DISCONTINUED | OUTPATIENT
Start: 2023-04-24 | End: 2023-04-29

## 2023-04-24 RX ORDER — ONDANSETRON 8 MG/1
4 TABLET, FILM COATED ORAL ONCE
Refills: 0 | Status: COMPLETED | OUTPATIENT
Start: 2023-04-24 | End: 2023-04-24

## 2023-04-24 RX ORDER — SODIUM CHLORIDE 9 MG/ML
1000 INJECTION INTRAMUSCULAR; INTRAVENOUS; SUBCUTANEOUS ONCE
Refills: 0 | Status: COMPLETED | OUTPATIENT
Start: 2023-04-24 | End: 2023-04-24

## 2023-04-24 RX ORDER — MORPHINE SULFATE 50 MG/1
4 CAPSULE, EXTENDED RELEASE ORAL EVERY 4 HOURS
Refills: 0 | Status: DISCONTINUED | OUTPATIENT
Start: 2023-04-24 | End: 2023-04-25

## 2023-04-24 RX ORDER — PIPERACILLIN AND TAZOBACTAM 4; .5 G/20ML; G/20ML
3.38 INJECTION, POWDER, LYOPHILIZED, FOR SOLUTION INTRAVENOUS EVERY 8 HOURS
Refills: 0 | Status: DISCONTINUED | OUTPATIENT
Start: 2023-04-25 | End: 2023-04-28

## 2023-04-24 RX ORDER — ACETAMINOPHEN 500 MG
650 TABLET ORAL EVERY 6 HOURS
Refills: 0 | Status: DISCONTINUED | OUTPATIENT
Start: 2023-04-24 | End: 2023-04-26

## 2023-04-24 RX ORDER — LEVOTHYROXINE SODIUM 125 MCG
37.5 TABLET ORAL AT BEDTIME
Refills: 0 | Status: DISCONTINUED | OUTPATIENT
Start: 2023-04-24 | End: 2023-04-28

## 2023-04-24 RX ORDER — PIPERACILLIN AND TAZOBACTAM 4; .5 G/20ML; G/20ML
3.38 INJECTION, POWDER, LYOPHILIZED, FOR SOLUTION INTRAVENOUS ONCE
Refills: 0 | Status: COMPLETED | OUTPATIENT
Start: 2023-04-24 | End: 2023-04-24

## 2023-04-24 RX ORDER — MORPHINE SULFATE 50 MG/1
2 CAPSULE, EXTENDED RELEASE ORAL EVERY 4 HOURS
Refills: 0 | Status: DISCONTINUED | OUTPATIENT
Start: 2023-04-24 | End: 2023-04-25

## 2023-04-24 RX ORDER — LANOLIN ALCOHOL/MO/W.PET/CERES
3 CREAM (GRAM) TOPICAL AT BEDTIME
Refills: 0 | Status: DISCONTINUED | OUTPATIENT
Start: 2023-04-24 | End: 2023-04-26

## 2023-04-24 RX ORDER — ONDANSETRON 8 MG/1
4 TABLET, FILM COATED ORAL EVERY 8 HOURS
Refills: 0 | Status: DISCONTINUED | OUTPATIENT
Start: 2023-04-24 | End: 2023-04-29

## 2023-04-24 RX ADMIN — Medication 37.5 MICROGRAM(S): at 22:28

## 2023-04-24 RX ADMIN — MORPHINE SULFATE 4 MILLIGRAM(S): 50 CAPSULE, EXTENDED RELEASE ORAL at 19:30

## 2023-04-24 RX ADMIN — PIPERACILLIN AND TAZOBACTAM 200 GRAM(S): 4; .5 INJECTION, POWDER, LYOPHILIZED, FOR SOLUTION INTRAVENOUS at 20:05

## 2023-04-24 RX ADMIN — MORPHINE SULFATE 4 MILLIGRAM(S): 50 CAPSULE, EXTENDED RELEASE ORAL at 22:02

## 2023-04-24 RX ADMIN — MORPHINE SULFATE 4 MILLIGRAM(S): 50 CAPSULE, EXTENDED RELEASE ORAL at 18:44

## 2023-04-24 RX ADMIN — ENOXAPARIN SODIUM 40 MILLIGRAM(S): 100 INJECTION SUBCUTANEOUS at 22:28

## 2023-04-24 RX ADMIN — ONDANSETRON 4 MILLIGRAM(S): 8 TABLET, FILM COATED ORAL at 18:44

## 2023-04-24 RX ADMIN — SODIUM CHLORIDE 1000 MILLILITER(S): 9 INJECTION INTRAMUSCULAR; INTRAVENOUS; SUBCUTANEOUS at 18:43

## 2023-04-24 RX ADMIN — PANTOPRAZOLE SODIUM 40 MILLIGRAM(S): 20 TABLET, DELAYED RELEASE ORAL at 22:28

## 2023-04-24 RX ADMIN — FAMOTIDINE 20 MILLIGRAM(S): 10 INJECTION INTRAVENOUS at 18:44

## 2023-04-24 RX ADMIN — MORPHINE SULFATE 4 MILLIGRAM(S): 50 CAPSULE, EXTENDED RELEASE ORAL at 22:30

## 2023-04-24 NOTE — ED PROVIDER NOTE - CLINICAL SUMMARY MEDICAL DECISION MAKING FREE TEXT BOX
36 y/o F with pmh hypothyroid, cholecystectomy presents to ED for c/o epigastric abd pain, nv//d since this AM. Pt states woke up with pain. Came on suddenly. Was in usual state of health yesterday. No fever or chills. Took pepto bismol but vomited after. Denies hematuria and dysuria.    VSS Afebrile, NAD  HEENT - clear  PERRL EOMI, anicteric.  Neck supple  lungs clear  Cor S1S2 RR - MGR  Abd soft vague epigastric tenderness, no mass or HSM, no rebound  Ext FROM intact, no edema  Neuro Intact, no deficits.  Skin Warm and dry no rash.    Imp- Abd pain, RO Gastritis, GE, Colitis.  Plan - labs, urine, CT.    I performed a history and physical exam of the patient and discussed their management with the advanced care provider. I reviewed the advanced care provider's note and agree with the documented findings and plan of care. My medical decision making and objective findings are found above.

## 2023-04-24 NOTE — ED PROVIDER NOTE - PHYSICAL EXAMINATION
PE:   GEN: Awake, alert, interactive, NAD, non-toxic appearing.   HEAD: Atraumatic  EYES: Sclera white, conjunctiva pink, PERRLA  CARDIAC: Reg rate and rhythm, S1,S2, no murmur/rub/gallop. Strong central and peripheral pulses, Brisk cap refill, no evident pedal edema.   RESP: No distress noted. L/S clear = Bilat without accessory muscle use, wheeze, rhonchi, rales.   ABD: soft, supple, +EPIGASTRIC TTP, no guarding. BS x 4, normoactive.   NEURO: AOx3, CN II-XII grossly intact without focal deficit.   MSK: Moving all extremities with no apparent deformities.   SKIN: Warm, dry, normal color, without apparent rashes.

## 2023-04-24 NOTE — PATIENT PROFILE ADULT - FALL HARM RISK - UNIVERSAL INTERVENTIONS
Bed in lowest position, wheels locked, appropriate side rails in place/Call bell, personal items and telephone in reach/Instruct patient to call for assistance before getting out of bed or chair/Non-slip footwear when patient is out of bed/[place] to call system/Physically safe environment - no spills, clutter or unnecessary equipment/Purposeful Proactive Rounding/Room/bathroom lighting operational, light cord in reach

## 2023-04-24 NOTE — PATIENT PROFILE ADULT - MEDICATIONS/VISITS
Denies known Latex allergy or symptoms of Latex sensitivity.  Medications verified, no changes.    C/O's  none     no

## 2023-04-24 NOTE — ED PROVIDER NOTE - PROGRESS NOTE DETAILS
CT results reviewed with rosas Manuel. She advised she looked at the images and feels this is likely more inflammatory in nature and should be treated with IV antibiotics, No nee for surgical intervention at this time. No need for NG Tube at this itme as pt has not had an episode of vomiting since arriving to ED and stomach appears decompressed on CT scan. Advised to admit to medicine Case discussed with Dr Vlilanueva who accpets admission CT results reviewed with ericaer Dr Manuel. She advised she looked at the images and feels this is likely more inflammatory in nature and should be treated with IV antibiotics, No need for surgical intervention at this time. No need for NG Tube at this itme as pt has not had an episode of vomiting since arriving to ED and stomach appears decompressed on CT scan. Advised to admit to medicine. Pt and husabnd aware and agree with plan.

## 2023-04-24 NOTE — ED PROVIDER NOTE - OBJECTIVE STATEMENT
38 y/o F with pmh hypothyroid, cholecystectomy presents to ED for c/o epigastric abd pain, nv//d since this AM. Pt states woke up with pain. Came on suddenly. Was in usual state of health yesterday. No fever or chills. Took pepto bismol but vomited after. Denies hematuria and dysuria.

## 2023-04-24 NOTE — ED ADULT NURSE NOTE - OBJECTIVE STATEMENT
36 y/o female received axo4 ambulatory c/o lower abd pain today 10/10 constant, worsened over the past 2 hours, associated with nausea, denies vomiting.

## 2023-04-25 ENCOUNTER — TRANSCRIPTION ENCOUNTER (OUTPATIENT)
Age: 38
End: 2023-04-25

## 2023-04-25 LAB
A1C WITH ESTIMATED AVERAGE GLUCOSE RESULT: 5.4 % — SIGNIFICANT CHANGE UP (ref 4–5.6)
ALBUMIN SERPL ELPH-MCNC: 3.5 G/DL — SIGNIFICANT CHANGE UP (ref 3.3–5)
ALP SERPL-CCNC: 88 U/L — SIGNIFICANT CHANGE UP (ref 30–120)
ALT FLD-CCNC: 48 U/L DA — SIGNIFICANT CHANGE UP (ref 10–60)
ANION GAP SERPL CALC-SCNC: 12 MMOL/L — SIGNIFICANT CHANGE UP (ref 5–17)
AST SERPL-CCNC: 25 U/L — SIGNIFICANT CHANGE UP (ref 10–40)
BASOPHILS # BLD AUTO: 0.01 K/UL — SIGNIFICANT CHANGE UP (ref 0–0.2)
BASOPHILS NFR BLD AUTO: 0.1 % — SIGNIFICANT CHANGE UP (ref 0–2)
BILIRUB SERPL-MCNC: 0.6 MG/DL — SIGNIFICANT CHANGE UP (ref 0.2–1.2)
BUN SERPL-MCNC: 12 MG/DL — SIGNIFICANT CHANGE UP (ref 7–23)
CALCIUM SERPL-MCNC: 8.3 MG/DL — LOW (ref 8.4–10.5)
CHLORIDE SERPL-SCNC: 105 MMOL/L — SIGNIFICANT CHANGE UP (ref 96–108)
CHOLEST SERPL-MCNC: 213 MG/DL — HIGH
CO2 SERPL-SCNC: 23 MMOL/L — SIGNIFICANT CHANGE UP (ref 22–31)
CREAT SERPL-MCNC: 0.64 MG/DL — SIGNIFICANT CHANGE UP (ref 0.5–1.3)
CULTURE RESULTS: SIGNIFICANT CHANGE UP
EGFR: 116 ML/MIN/1.73M2 — SIGNIFICANT CHANGE UP
EOSINOPHIL # BLD AUTO: 0.01 K/UL — SIGNIFICANT CHANGE UP (ref 0–0.5)
EOSINOPHIL NFR BLD AUTO: 0.1 % — SIGNIFICANT CHANGE UP (ref 0–6)
ESTIMATED AVERAGE GLUCOSE: 108 MG/DL — SIGNIFICANT CHANGE UP (ref 68–114)
GLUCOSE SERPL-MCNC: 125 MG/DL — HIGH (ref 70–99)
HCT VFR BLD CALC: 37.4 % — SIGNIFICANT CHANGE UP (ref 34.5–45)
HDLC SERPL-MCNC: 50 MG/DL — LOW
HGB BLD-MCNC: 12.6 G/DL — SIGNIFICANT CHANGE UP (ref 11.5–15.5)
IMM GRANULOCYTES NFR BLD AUTO: 0.4 % — SIGNIFICANT CHANGE UP (ref 0–0.9)
LIPID PNL WITH DIRECT LDL SERPL: 120 MG/DL — HIGH
LYMPHOCYTES # BLD AUTO: 17.3 % — SIGNIFICANT CHANGE UP (ref 13–44)
LYMPHOCYTES # BLD AUTO: 2.01 K/UL — SIGNIFICANT CHANGE UP (ref 1–3.3)
MCHC RBC-ENTMCNC: 29.2 PG — SIGNIFICANT CHANGE UP (ref 27–34)
MCHC RBC-ENTMCNC: 33.7 GM/DL — SIGNIFICANT CHANGE UP (ref 32–36)
MCV RBC AUTO: 86.6 FL — SIGNIFICANT CHANGE UP (ref 80–100)
MONOCYTES # BLD AUTO: 0.69 K/UL — SIGNIFICANT CHANGE UP (ref 0–0.9)
MONOCYTES NFR BLD AUTO: 5.9 % — SIGNIFICANT CHANGE UP (ref 2–14)
NEUTROPHILS # BLD AUTO: 8.85 K/UL — HIGH (ref 1.8–7.4)
NEUTROPHILS NFR BLD AUTO: 76.2 % — SIGNIFICANT CHANGE UP (ref 43–77)
NON HDL CHOLESTEROL: 163 MG/DL — HIGH
NRBC # BLD: 0 /100 WBCS — SIGNIFICANT CHANGE UP (ref 0–0)
PLATELET # BLD AUTO: 188 K/UL — SIGNIFICANT CHANGE UP (ref 150–400)
POTASSIUM SERPL-MCNC: 3.9 MMOL/L — SIGNIFICANT CHANGE UP (ref 3.5–5.3)
POTASSIUM SERPL-SCNC: 3.9 MMOL/L — SIGNIFICANT CHANGE UP (ref 3.5–5.3)
PROT SERPL-MCNC: 7.3 G/DL — SIGNIFICANT CHANGE UP (ref 6–8.3)
RBC # BLD: 4.32 M/UL — SIGNIFICANT CHANGE UP (ref 3.8–5.2)
RBC # FLD: 13.5 % — SIGNIFICANT CHANGE UP (ref 10.3–14.5)
SODIUM SERPL-SCNC: 140 MMOL/L — SIGNIFICANT CHANGE UP (ref 135–145)
SPECIMEN SOURCE: SIGNIFICANT CHANGE UP
TRIGL SERPL-MCNC: 212 MG/DL — HIGH
TSH SERPL-MCNC: 1.81 UIU/ML — SIGNIFICANT CHANGE UP (ref 0.27–4.2)
WBC # BLD: 11.62 K/UL — HIGH (ref 3.8–10.5)
WBC # FLD AUTO: 11.62 K/UL — HIGH (ref 3.8–10.5)

## 2023-04-25 PROCEDURE — 99223 1ST HOSP IP/OBS HIGH 75: CPT

## 2023-04-25 PROCEDURE — 93010 ELECTROCARDIOGRAM REPORT: CPT

## 2023-04-25 RX ORDER — SODIUM CHLORIDE 9 MG/ML
1000 INJECTION, SOLUTION INTRAVENOUS ONCE
Refills: 0 | Status: COMPLETED | OUTPATIENT
Start: 2023-04-25 | End: 2023-04-25

## 2023-04-25 RX ORDER — HYDROMORPHONE HYDROCHLORIDE 2 MG/ML
0.5 INJECTION INTRAMUSCULAR; INTRAVENOUS; SUBCUTANEOUS EVERY 4 HOURS
Refills: 0 | Status: DISCONTINUED | OUTPATIENT
Start: 2023-04-25 | End: 2023-04-29

## 2023-04-25 RX ORDER — SODIUM CHLORIDE 9 MG/ML
1000 INJECTION, SOLUTION INTRAVENOUS
Refills: 0 | Status: DISCONTINUED | OUTPATIENT
Start: 2023-04-25 | End: 2023-04-29

## 2023-04-25 RX ORDER — IBUPROFEN 200 MG
800 TABLET ORAL EVERY 6 HOURS
Refills: 0 | Status: DISCONTINUED | OUTPATIENT
Start: 2023-04-25 | End: 2023-04-29

## 2023-04-25 RX ADMIN — Medication 800 MILLIGRAM(S): at 20:10

## 2023-04-25 RX ADMIN — PANTOPRAZOLE SODIUM 40 MILLIGRAM(S): 20 TABLET, DELAYED RELEASE ORAL at 21:55

## 2023-04-25 RX ADMIN — ENOXAPARIN SODIUM 40 MILLIGRAM(S): 100 INJECTION SUBCUTANEOUS at 21:55

## 2023-04-25 RX ADMIN — Medication 650 MILLIGRAM(S): at 15:36

## 2023-04-25 RX ADMIN — PIPERACILLIN AND TAZOBACTAM 25 GRAM(S): 4; .5 INJECTION, POWDER, LYOPHILIZED, FOR SOLUTION INTRAVENOUS at 17:42

## 2023-04-25 RX ADMIN — Medication 650 MILLIGRAM(S): at 09:10

## 2023-04-25 RX ADMIN — PIPERACILLIN AND TAZOBACTAM 25 GRAM(S): 4; .5 INJECTION, POWDER, LYOPHILIZED, FOR SOLUTION INTRAVENOUS at 10:44

## 2023-04-25 RX ADMIN — SODIUM CHLORIDE 1000 MILLILITER(S): 9 INJECTION, SOLUTION INTRAVENOUS at 12:10

## 2023-04-25 RX ADMIN — Medication 650 MILLIGRAM(S): at 14:39

## 2023-04-25 RX ADMIN — MORPHINE SULFATE 2 MILLIGRAM(S): 50 CAPSULE, EXTENDED RELEASE ORAL at 01:30

## 2023-04-25 RX ADMIN — ONDANSETRON 4 MILLIGRAM(S): 8 TABLET, FILM COATED ORAL at 01:02

## 2023-04-25 RX ADMIN — MORPHINE SULFATE 2 MILLIGRAM(S): 50 CAPSULE, EXTENDED RELEASE ORAL at 01:05

## 2023-04-25 RX ADMIN — Medication 650 MILLIGRAM(S): at 08:13

## 2023-04-25 RX ADMIN — Medication 37.5 MICROGRAM(S): at 21:55

## 2023-04-25 RX ADMIN — Medication 400 MILLIGRAM(S): at 19:41

## 2023-04-25 RX ADMIN — PIPERACILLIN AND TAZOBACTAM 25 GRAM(S): 4; .5 INJECTION, POWDER, LYOPHILIZED, FOR SOLUTION INTRAVENOUS at 02:22

## 2023-04-25 RX ADMIN — SODIUM CHLORIDE 100 MILLILITER(S): 9 INJECTION, SOLUTION INTRAVENOUS at 13:36

## 2023-04-25 NOTE — CONSULT NOTE ADULT - SUBJECTIVE AND OBJECTIVE BOX
GENERAL SURGERY PA CONSULT NOTE    HPI:  36 yo F with hx of hypothyroidism and s/p lap tin, presented to ER due to worsening periumbilical pain associated with nausea and vomiting x 1 day.   Pain started shortly after eating breakfast. Had a small BM in the morning. - Flatus. Admits to chronic hx of constipation. Has hx of similar abd pain intermittently but never with n/v or at this intensity. Pt thought it was due to gastritis. Has not had colonoscopy. Overnight pt had nausea that  has improved. Pain also has improved but still present. Describes pain as a heaviness/ fullness in epigastric/ umbilical region. +dizziness, chills. -cp, sob, fevers     PAST MEDICAL & SURGICAL HISTORY:  Hypothyroid  History of cholecystectomy    Review of Systems:  +dizziness, fatigue, chills  -fevers, cp, sob    MEDICATIONS  (STANDING):  enoxaparin Injectable 40 milliGRAM(s) SubCutaneous every 24 hours  lactated ringers Bolus 1000 milliLiter(s) IV Bolus once  lactated ringers. 1000 milliLiter(s) (100 mL/Hr) IV Continuous <Continuous>  levothyroxine Injectable 37.5 MICROGram(s) IV Push at bedtime  pantoprazole  Injectable 40 milliGRAM(s) IV Push every 24 hours  piperacillin/tazobactam IVPB.. 3.375 Gram(s) IV Intermittent every 8 hours    MEDICATIONS  (PRN):  acetaminophen     Tablet .. 650 milliGRAM(s) Oral every 6 hours PRN Temp greater or equal to 38C (100.4F), Mild Pain (1 - 3)  aluminum hydroxide/magnesium hydroxide/simethicone Suspension 30 milliLiter(s) Oral every 4 hours PRN Dyspepsia  melatonin 3 milliGRAM(s) Oral at bedtime PRN Insomnia  morphine  - Injectable 2 milliGRAM(s) IV Push every 4 hours PRN Moderate Pain (4 - 6)  morphine  - Injectable 4 milliGRAM(s) IV Push every 4 hours PRN Severe Pain (7 - 10)  ondansetron Injectable 4 milliGRAM(s) IV Push every 8 hours PRN Nausea and/or Vomiting    Allergies  No Known Allergies    FAMILY HISTORY:  Father- hx of achalasia      Vital Signs Last 24 Hrs  T(C): 36.6 (2023 07:52), Max: 38 (2023 03:19)  T(F): 97.8 (2023 07:52), Max: 100.4 (2023 03:19)  HR: 73 (2023 07:52) (65 - 76)  BP: 96/66 (2023 07:52) (91/56 - 105/70)  BP(mean): --  RR: 16 (2023 07:52) (14 - 16)  SpO2: 98% (2023 07:52) (96% - 100%)  Parameters below as of 2023 07:52  Patient On (Oxygen Delivery Method): room air    Physical Exam:  General:  Appears stated age, well-groomed, well-nourished, no distress  Eyes : SHEYLA  HENT:  WNL, no JVD  Chest: unlabored breathing, equal chest expansion  Cardiovascular: pulse regular  Abdomen: soft, nd BS X 4. tenderness in epigastric and umbilical area. No guarding or rebound tenderness.   Extremities:  symmetrical, NT  Skin: No rash  Neuro/Psych:  Alert, oriented to time, place and person     LABS:                        12.6   11.62 )-----------( 188      ( 2023 05:48 )             37.4     04-25    140  |  105  |  12  ----------------------------<  125<H>  3.9   |  23  |  0.64    Ca    8.3<L>      2023 05:48    TPro  7.3  /  Alb  3.5  /  TBili  0.6  /  DBili  x   /  AST  25  /  ALT  48  /  AlkPhos  88  04-25    PT/INR - ( 2023 19:40 )   PT: 12.2 sec;   INR: 1.03 ratio      PTT - ( 2023 19:40 )  PTT:30.3 sec  Urinalysis Basic - ( 2023 19:40 )    Color: Yellow / Appearance: Clear / S.005 / pH: x  Gluc: x / Ketone: Moderate  / Bili: Negative / Urobili: Negative mg/dL   Blood: x / Protein: 30 mg/dL / Nitrite: Negative   Leuk Esterase: Negative / RBC: 0-2 /HPF / WBC 0-2   Sq Epi: x / Non Sq Epi: x / Bacteria: Negative    RADIOLOGY & ADDITIONAL STUDIES:  < from: CT Abdomen and Pelvis w/ IV Cont (04.24. @ 19:03) >  PROCEDURE DATE:  2023    INTERPRETATION:  CLINICAL INFORMATION: Severe epigastric pain for one day.  COMPARISON: CT abdomen pelvis dated 2023.  CONTRAST/COMPLICATIONS:  IV Contrast: Omnipaque 350  90 cc administered   10 cc discarded  Oral Contrast: NONE  Complications: None reported at time of study completion  PROCEDURE:  CT of the Abdomen and Pelvis was performed.  Sagittal and coronal reformats were performed.  FINDINGS:  LOWER CHEST: Within normal limits.  LIVER: Within normal limits.  BILE DUCTS: Normal caliber.  GALLBLADDER: Cholecystectomy.  SPLEEN: Within normal limits.  PANCREAS: Within normal limits.  ADRENALS: Within normal limits.  KIDNEYS/URETERS: Within normal limits.  BLADDER: Nondistended.  REPRODUCTIVE ORGANS: Uterus and adnexa within normal limits.  BOWEL: There is fluid-filled distention of small bowel to a point of   transition in the mid ileum. At thispoint there is a stool-filled   diverticulum followed by a luminal stricture (3, 109; 5, 55). This   finding was present on prior CT in the absence of associated obstruction.   Appendix is normal.  PERITONEUM: No ascites.  VESSELS: Within normal limits.  RETROPERITONEUM/LYMPH NODES: No lymphadenopathy.  ABDOMINAL WALL: Within normal limits.  BONES: Within normal limits.    IMPRESSION:  Mechanical obstruction in the mid ileum secondary to stricture   immediately distal to stool-filled diverticulum(possibly a   postinflammatory stricture secondary to prior episode of Meckel's   diverticulitis).  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    Plan for laparoscopic small bowel resection   No n/v at this time. Will order NGT if develops nausea/vomiting.   Diet - NPO  GI/DVT prophylaxis  Analgesia prn- multimodal  OOB/ambulation on the floor  Incentive spirometry/Cough/Deep breathing exercises  IVF, IV bolus ordered   Zosyn  Med clearance for OR  Case & plan discussed with Dr. Desai and Dr. Villanueva and patient's nurse.     GENERAL SURGERY PA CONSULT NOTE    HPI:  38 yo F with hx of hypothyroidism and s/p lap tin, presented to ER due to worsening periumbilical pain associated with nausea and vomiting x 1 day.   Pain started shortly after eating breakfast. Had a small BM in the morning. - Flatus. Admits to chronic hx of constipation. Has hx of similar abd pain intermittently but never with n/v or at this intensity. Pt thought it was due to gastritis. Has not had colonoscopy. Overnight pt had nausea that  has improved. Pain also has improved but still present. Describes pain as a heaviness/ fullness in epigastric/ umbilical region. +dizziness, chills. -cp, sob, fevers     PAST MEDICAL & SURGICAL HISTORY:  Hypothyroid  History of cholecystectomy    Review of Systems:  +dizziness, fatigue, chills  -fevers, cp, sob    MEDICATIONS  (STANDING):  enoxaparin Injectable 40 milliGRAM(s) SubCutaneous every 24 hours  lactated ringers Bolus 1000 milliLiter(s) IV Bolus once  lactated ringers. 1000 milliLiter(s) (100 mL/Hr) IV Continuous <Continuous>  levothyroxine Injectable 37.5 MICROGram(s) IV Push at bedtime  pantoprazole  Injectable 40 milliGRAM(s) IV Push every 24 hours  piperacillin/tazobactam IVPB.. 3.375 Gram(s) IV Intermittent every 8 hours    MEDICATIONS  (PRN):  acetaminophen     Tablet .. 650 milliGRAM(s) Oral every 6 hours PRN Temp greater or equal to 38C (100.4F), Mild Pain (1 - 3)  aluminum hydroxide/magnesium hydroxide/simethicone Suspension 30 milliLiter(s) Oral every 4 hours PRN Dyspepsia  melatonin 3 milliGRAM(s) Oral at bedtime PRN Insomnia  morphine  - Injectable 2 milliGRAM(s) IV Push every 4 hours PRN Moderate Pain (4 - 6)  morphine  - Injectable 4 milliGRAM(s) IV Push every 4 hours PRN Severe Pain (7 - 10)  ondansetron Injectable 4 milliGRAM(s) IV Push every 8 hours PRN Nausea and/or Vomiting    Allergies  No Known Allergies    FAMILY HISTORY:  Father- hx of achalasia      Vital Signs Last 24 Hrs  T(C): 36.6 (2023 07:52), Max: 38 (2023 03:19)  T(F): 97.8 (2023 07:52), Max: 100.4 (2023 03:19)  HR: 73 (2023 07:52) (65 - 76)  BP: 96/66 (2023 07:52) (91/56 - 105/70)  BP(mean): --  RR: 16 (2023 07:52) (14 - 16)  SpO2: 98% (2023 07:52) (96% - 100%)  Parameters below as of 2023 07:52  Patient On (Oxygen Delivery Method): room air    Physical Exam:  General:  Appears stated age, well-groomed, well-nourished, no distress  Eyes : SHEYLA  HEENT:  WNL, no JVD  Chest: unlabored breathing, equal chest expansion  Cardiovascular: pulse regular  Abdomen: soft, nd BS X 4. tenderness in epigastric and umbilical area. No guarding or rebound tenderness.   Extremities:  symmetrical, NT  Skin: No rash  Neuro/Psych:  Alert, oriented to time, place and person     LABS:                        12.6   11.62 )-----------( 188      ( 2023 05:48 )             37.4     04-25    140  |  105  |  12  ----------------------------<  125<H>  3.9   |  23  |  0.64    Ca    8.3<L>      2023 05:48    TPro  7.3  /  Alb  3.5  /  TBili  0.6  /  DBili  x   /  AST  25  /  ALT  48  /  AlkPhos  88  04-25    PT/INR - ( 2023 19:40 )   PT: 12.2 sec;   INR: 1.03 ratio      PTT - ( 2023 19:40 )  PTT:30.3 sec  Urinalysis Basic - ( 2023 19:40 )    Color: Yellow / Appearance: Clear / S.005 / pH: x  Gluc: x / Ketone: Moderate  / Bili: Negative / Urobili: Negative mg/dL   Blood: x / Protein: 30 mg/dL / Nitrite: Negative   Leuk Esterase: Negative / RBC: 0-2 /HPF / WBC 0-2   Sq Epi: x / Non Sq Epi: x / Bacteria: Negative    RADIOLOGY & ADDITIONAL STUDIES:  < from: CT Abdomen and Pelvis w/ IV Cont (04.24. @ 19:03) >  PROCEDURE DATE:  2023    INTERPRETATION:  CLINICAL INFORMATION: Severe epigastric pain for one day.  COMPARISON: CT abdomen pelvis dated 2023.  CONTRAST/COMPLICATIONS:  IV Contrast: Omnipaque 350  90 cc administered   10 cc discarded  Oral Contrast: NONE  Complications: None reported at time of study completion  PROCEDURE:  CT of the Abdomen and Pelvis was performed.  Sagittal and coronal reformats were performed.  FINDINGS:  LOWER CHEST: Within normal limits.  LIVER: Within normal limits.  BILE DUCTS: Normal caliber.  GALLBLADDER: Cholecystectomy.  SPLEEN: Within normal limits.  PANCREAS: Within normal limits.  ADRENALS: Within normal limits.  KIDNEYS/URETERS: Within normal limits.  BLADDER: Nondistended.  REPRODUCTIVE ORGANS: Uterus and adnexa within normal limits.  BOWEL: There is fluid-filled distention of small bowel to a point of   transition in the mid ileum. At thispoint there is a stool-filled   diverticulum followed by a luminal stricture (3, 109; 5, 55). This   finding was present on prior CT in the absence of associated obstruction.   Appendix is normal.  PERITONEUM: No ascites.  VESSELS: Within normal limits.  RETROPERITONEUM/LYMPH NODES: No lymphadenopathy.  ABDOMINAL WALL: Within normal limits.  BONES: Within normal limits.    IMPRESSION:  Mechanical obstruction in the mid ileum secondary to stricture   immediately distal to stool-filled diverticulum(possibly a   postinflammatory stricture secondary to prior episode of Meckel's   diverticulitis).  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    Plan for laparoscopic small bowel resection   No n/v at this time. Will order NGT if develops nausea/vomiting.   Diet - NPO  GI/DVT prophylaxis  Analgesia prn- multimodal  OOB/ambulation on the floor  Incentive spirometry/Cough/Deep breathing exercises  IVF, IV bolus ordered   Zosyn  Med clearance for OR  Case & plan discussed with Dr. Manuel and Dr. Villanueva and patient's nurse.

## 2023-04-25 NOTE — CARE COORDINATION ASSESSMENT. - OTHER PERTINENT REFERRAL INFORMATION
Spoke with patient at bedside. Role of CM explained w verbalized understanding. Pt lives w family in a [place] . She was independent PTA w/o a device.Family will transport  NN identified for DC. CM to remain available

## 2023-04-25 NOTE — CONSULT NOTE ADULT - NS ATTEND AMEND GEN_ALL_CORE FT
I have personally seen and examined the patient.  I fully participated in the care of this patient.  I have made amendments to the documentation where necessary, and agree with the history, physical exam, impression/assessment, and plan as documented by the PA    Pt presenting with obstructive symptoms 2/2 inflammatory stricture associated with Meckel's diverticulum   Nausea/vomiting have improved  Abd soft, tender in epigastrium and periumbilical without rebound/guarding  Leukocytosis likely reactive and possible 2/2 inflammatory stricture   CT imaging reviewed with radiology (Dr. Jain) - Meckel's diverticulum in mid ileum with sufficient length distally of collapsed bowel, inflammatory stricture right at take off of Meckel's diverticulum which appears large and fluid filled, and greater than 50% diameter of small bowel. Associated SBO 2/2 stricture.    Discussed with patient and family at bedside. Given inflammatory stricture causing SBO and presence of Meckel's diverticulum would recommend surgical resection. Patient agreeable. Will continue IV abx for inflammatory stricture prior to OR for laparoscopic SB resection.    -keep NPO  given obstruction, NGT if nausea/vomiting return though patient feels better today  -continue abx  -will plan for OR later this week   -medical optimization/clearance

## 2023-04-25 NOTE — H&P ADULT - PROBLEM SELECTOR PLAN 1
Patient with abdominal pain, POA due to small bowel obstruction.   Will place on pain medications as needed.   Keep NPO  IV hydration.   Further management as per patient's clinical course.

## 2023-04-25 NOTE — H&P ADULT - HISTORY OF PRESENT ILLNESS
37 years old female  37 years old female with past medical history of hypothyroidism, h/o lap tin, who came in to ER last night with 1 day history of nausea, vomiting and epigastric pain. Patient states she had pain occasionally in past but never with vomiting. As she had multiple episode of vomiting, she came in to ER. In Er, patient is noted to have partial small bowel obstruction. She is admitted for further care.     Patient is feeling slightly better.   She still has some pain.   Denies any chest pain or pressure.   No dizziness or syncope.    37 years old female with past medical history of hypothyroidism, h/o lap tin, who came in to ER last night with 1 day history of nausea, vomiting and epigastric pain. Patient states she had pain occasionally in past but never with vomiting. As she had multiple episode of vomiting, she came in to ER. In ER, patient is noted to have partial small bowel obstruction. She is admitted for further care.     Patient is feeling slightly better.   She still has some pain.   Denies any chest pain or pressure.   No dizziness or syncope.

## 2023-04-25 NOTE — H&P ADULT - NSHPLABSRESULTS_GEN_ALL_CORE
12.6   11.62 )-----------( 188      ( 25 Apr 2023 05:48 )             37.4     25 Apr 2023 05:48    140    |  105    |  12     ----------------------------<  125    3.9     |  23     |  0.64     Ca    8.3        25 Apr 2023 05:48    TPro  7.3    /  Alb  3.5    /  TBili  0.6    /  DBili  x      /  AST  25     /  ALT  48     /  AlkPhos  88     25 Apr 2023 05:48    CAPILLARY BLOOD GLUCOSE        PT/INR - ( 24 Apr 2023 19:40 )   PT: 12.2 sec;   INR: 1.03 ratio         PTT - ( 24 Apr 2023 19:40 )  PTT:30.3 sec 12.6   11.62 )-----------( 188      ( 25 Apr 2023 05:48 )             37.4     25 Apr 2023 05:48    140    |  105    |  12     ----------------------------<  125    3.9     |  23     |  0.64     Ca    8.3        25 Apr 2023 05:48    TPro  7.3    /  Alb  3.5    /  TBili  0.6    /  DBili  x      /  AST  25     /  ALT  48     /  AlkPhos  88     25 Apr 2023 05:48    PT/INR - ( 24 Apr 2023 19:40 )   PT: 12.2 sec;   INR: 1.03 ratio         PTT - ( 24 Apr 2023 19:40 )  PTT:30.3 sec    EKG: NSR, no acute ST-T changes.

## 2023-04-25 NOTE — CARE COORDINATION ASSESSMENT. - NSCAREPROVIDERS_GEN_ALL_CORE_FT
CARE PROVIDERS:  Accepting Physician: Cruzito Villanueva  Admitting: Cruzito Villanueva  Attending: Cruzito Villanueva  Consultant: Elena Parrish  Consultant: Mili Manuel  ED ACP: Ankita Angela  ED Attending: Dennis Montano  ED Nurse: Arlen Quiroz  Infection Control: Cony Pradhan  Nurse: Tiffany Heard  Nurse: Nicol Murdock  Nurse: Luciana Gama  Nurse: Adam, Merline  Nurse: Reina Owen  Nurse: Geneva Claros  Override: Reina Owen  PCA/Nursing Assistant: Geogria Hilario  Primary Team: Cruzito Villanueva  Primary Team: Tiff Hester  Registered Dietitian: Kayleigh Vicente  Respiratory Therapy: Micheal Resendiz  : Kylah Edouard// Supp. Assoc.: Yajaira Hutchinson

## 2023-04-25 NOTE — H&P ADULT - PROBLEM SELECTOR PLAN 2
Patient with small bowel obstruction.  Keep NPO  Empiric antibiotics as per surgical recommendations.   Surgical follow up.   No NG tube as per surgery recommendations.   Patient will most likely need small bowel resection as per surgery.   Further management as per patient's clinical course.

## 2023-04-25 NOTE — H&P ADULT - NSHPPHYSICALEXAM_GEN_ALL_CORE
Vital Signs Last 24 Hrs  T(C): 36.6 (25 Apr 2023 07:52), Max: 38 (25 Apr 2023 03:19)  T(F): 97.8 (25 Apr 2023 07:52), Max: 100.4 (25 Apr 2023 03:19)  HR: 73 (25 Apr 2023 07:52) (65 - 76)  BP: 96/66 (25 Apr 2023 07:52) (91/56 - 105/70)  BP(mean): --  RR: 16 (25 Apr 2023 07:52) (14 - 16)  SpO2: 98% (25 Apr 2023 07:52) (96% - 100%)    Parameters below as of 25 Apr 2023 07:52  Patient On (Oxygen Delivery Method): room air Vital Signs Last 24 Hrs  T(C): 36.6 (25 Apr 2023 07:52), Max: 38 (25 Apr 2023 03:19)  T(F): 97.8 (25 Apr 2023 07:52), Max: 100.4 (25 Apr 2023 03:19)  HR: 73 (25 Apr 2023 07:52) (65 - 76)  BP: 96/66 (25 Apr 2023 07:52) (91/56 - 105/70)  BP(mean): --  RR: 16 (25 Apr 2023 07:52) (14 - 16)  SpO2: 98% (25 Apr 2023 07:52) (96% - 100%)    Parameters below as of 25 Apr 2023 07:52  Patient On (Oxygen Delivery Method): room air    PHYSICAL EXAM:  GENERAL: NAD, well-groomed, well-developed  HEAD:  Atraumatic, Normocephalic  EYES: EOMI, PERRLA, conjunctiva and sclera clear  ENMT: Moist mucous membranes, no lesions  NECK: Supple.  CHEST/LUNG: Clear to auscultation bilaterally; No rales, rhonchi, wheezing, or rubs  HEART: S1, S2.   ABDOMEN: soft, mild discomfort in periumbilical area. BS +  EXTREMITIES:  2+ Peripheral Pulses, No clubbing, cyanosis, or edema  MS: No joint swelling or deformity.   LYMPH: No lymphadenopathy noted  SKIN: No rashes or lesions  NERVOUS SYSTEM:  No focal deficit.   PSYCH:  Awake and alert.

## 2023-04-25 NOTE — H&P ADULT - NSHPREVIEWOFSYSTEMS_GEN_ALL_CORE
REVIEW OF SYSTEMS:  CONSTITUTIONAL: No fever, weight loss, or fatigue  EYES: No eye pain, or discharge  ENMT: No sinus or throat pain  NECK: No pain or stiffness  BREASTS: No pain, masses, or nipple discharge  RESPIRATORY: No cough, wheezing, chills or hemoptysis; No shortness of breath  CARDIOVASCULAR: No chest pain, palpitations, dizziness, or leg swelling  GASTROINTESTINAL: + abdominal  pain. + nausea, + vomiting.  GENITOURINARY: No dysuria, frequency, hematuria, or incontinence  NEUROLOGICAL: No loss of strength, numbness, or tremors  SKIN: No itching, burning, rashes, or lesions   LYMPH NODES: No enlarged glands  ENDOCRINE: No polydipsia or polyuria  MUSCULOSKELETAL: No muscle, back, or extremity pain  PSYCHIATRIC: No depression, anxiety, mood swings.  HEME/LYMPH: No easy bruising, or bleeding gums  ALLERGY AND IMMUNOLOGIC: No hives or eczema

## 2023-04-25 NOTE — H&P ADULT - ASSESSMENT
37 years old female with past medical history of hypothyroidism, h/o lap tin, who came in to ER last night with 1 day history of nausea, vomiting and epigastric pain. Patient states she had pain occasionally in past but never with vomiting. As she had multiple episode of vomiting, she came in to ER. In Er, patient is noted to have partial small bowel obstruction. She is admitted for further care.

## 2023-04-25 NOTE — H&P ADULT - NSHPSOCIALHISTORY_GEN_ALL_CORE
Social History:    Marital Status:   Occupation:   Lives with:     Substance Use :  Tobacco Usage:  (   ) never smoked   (   ) former smoker   (   ) current smoker  (     ) pack year  (        ) last tobacco use date  Alcohol Usage:    (     ) Advanced Directives: (     ) declined   [  ] health care proxy Social History:    Marital Status:   Occupation: Works at [employer]  Lives with: Spouse    Substance Use : Denies  Tobacco Usage:  (X) never smoked   (   ) former smoker   (   ) current smoker  (     ) pack year  (        ) last tobacco use date  Alcohol Usage: Denies    (X ) Advanced Directives: (X) declined   [  ] health care proxy

## 2023-04-26 DIAGNOSIS — I95.9 HYPOTENSION, UNSPECIFIED: ICD-10-CM

## 2023-04-26 DIAGNOSIS — Z29.9 ENCOUNTER FOR PROPHYLACTIC MEASURES, UNSPECIFIED: ICD-10-CM

## 2023-04-26 DIAGNOSIS — K56.609 UNSPECIFIED INTESTINAL OBSTRUCTION, UNSPECIFIED AS TO PARTIAL VERSUS COMPLETE OBSTRUCTION: ICD-10-CM

## 2023-04-26 DIAGNOSIS — R10.9 UNSPECIFIED ABDOMINAL PAIN: ICD-10-CM

## 2023-04-26 DIAGNOSIS — E03.9 HYPOTHYROIDISM, UNSPECIFIED: ICD-10-CM

## 2023-04-26 DIAGNOSIS — Z01.818 ENCOUNTER FOR OTHER PREPROCEDURAL EXAMINATION: ICD-10-CM

## 2023-04-26 DIAGNOSIS — E87.6 HYPOKALEMIA: ICD-10-CM

## 2023-04-26 LAB
ANION GAP SERPL CALC-SCNC: 11 MMOL/L — SIGNIFICANT CHANGE UP (ref 5–17)
APTT BLD: 31.1 SEC — SIGNIFICANT CHANGE UP (ref 27.5–35.5)
BLD GP AB SCN SERPL QL: SIGNIFICANT CHANGE UP
BUN SERPL-MCNC: 7 MG/DL — SIGNIFICANT CHANGE UP (ref 7–23)
CALCIUM SERPL-MCNC: 8 MG/DL — LOW (ref 8.4–10.5)
CHLORIDE SERPL-SCNC: 103 MMOL/L — SIGNIFICANT CHANGE UP (ref 96–108)
CO2 SERPL-SCNC: 25 MMOL/L — SIGNIFICANT CHANGE UP (ref 22–31)
CREAT SERPL-MCNC: 0.67 MG/DL — SIGNIFICANT CHANGE UP (ref 0.5–1.3)
EGFR: 115 ML/MIN/1.73M2 — SIGNIFICANT CHANGE UP
GLUCOSE BLDC GLUCOMTR-MCNC: 85 MG/DL — SIGNIFICANT CHANGE UP (ref 70–99)
GLUCOSE SERPL-MCNC: 87 MG/DL — SIGNIFICANT CHANGE UP (ref 70–99)
HCT VFR BLD CALC: 35.2 % — SIGNIFICANT CHANGE UP (ref 34.5–45)
HGB BLD-MCNC: 11.6 G/DL — SIGNIFICANT CHANGE UP (ref 11.5–15.5)
INR BLD: 1.05 RATIO — SIGNIFICANT CHANGE UP (ref 0.88–1.16)
MCHC RBC-ENTMCNC: 28.6 PG — SIGNIFICANT CHANGE UP (ref 27–34)
MCHC RBC-ENTMCNC: 33 GM/DL — SIGNIFICANT CHANGE UP (ref 32–36)
MCV RBC AUTO: 86.7 FL — SIGNIFICANT CHANGE UP (ref 80–100)
NRBC # BLD: 0 /100 WBCS — SIGNIFICANT CHANGE UP (ref 0–0)
PLATELET # BLD AUTO: 166 K/UL — SIGNIFICANT CHANGE UP (ref 150–400)
POTASSIUM SERPL-MCNC: 3.1 MMOL/L — LOW (ref 3.5–5.3)
POTASSIUM SERPL-SCNC: 3.1 MMOL/L — LOW (ref 3.5–5.3)
PROTHROM AB SERPL-ACNC: 12.1 SEC — SIGNIFICANT CHANGE UP (ref 10.5–13.4)
RBC # BLD: 4.06 M/UL — SIGNIFICANT CHANGE UP (ref 3.8–5.2)
RBC # FLD: 13.6 % — SIGNIFICANT CHANGE UP (ref 10.3–14.5)
SODIUM SERPL-SCNC: 139 MMOL/L — SIGNIFICANT CHANGE UP (ref 135–145)
WBC # BLD: 9.7 K/UL — SIGNIFICANT CHANGE UP (ref 3.8–10.5)
WBC # FLD AUTO: 9.7 K/UL — SIGNIFICANT CHANGE UP (ref 3.8–10.5)

## 2023-04-26 PROCEDURE — 99233 SBSQ HOSP IP/OBS HIGH 50: CPT | Mod: 57

## 2023-04-26 PROCEDURE — 44202 LAP ENTERECTOMY: CPT | Mod: AS,22

## 2023-04-26 PROCEDURE — 71045 X-RAY EXAM CHEST 1 VIEW: CPT | Mod: 26

## 2023-04-26 PROCEDURE — 44202 LAP ENTERECTOMY: CPT | Mod: 22

## 2023-04-26 PROCEDURE — 88307 TISSUE EXAM BY PATHOLOGIST: CPT | Mod: 26

## 2023-04-26 PROCEDURE — 74018 RADEX ABDOMEN 1 VIEW: CPT | Mod: 26

## 2023-04-26 DEVICE — STAPLER COVIDIEN TRI-STAPLE 60MM TAN RELOAD: Type: IMPLANTABLE DEVICE | Status: FUNCTIONAL

## 2023-04-26 RX ORDER — HYDROMORPHONE HYDROCHLORIDE 2 MG/ML
1 INJECTION INTRAMUSCULAR; INTRAVENOUS; SUBCUTANEOUS
Refills: 0 | Status: DISCONTINUED | OUTPATIENT
Start: 2023-04-26 | End: 2023-04-26

## 2023-04-26 RX ORDER — ACETAMINOPHEN 500 MG
1000 TABLET ORAL EVERY 6 HOURS
Refills: 0 | Status: COMPLETED | OUTPATIENT
Start: 2023-04-26 | End: 2023-04-27

## 2023-04-26 RX ORDER — ONDANSETRON 8 MG/1
4 TABLET, FILM COATED ORAL ONCE
Refills: 0 | Status: DISCONTINUED | OUTPATIENT
Start: 2023-04-26 | End: 2023-04-26

## 2023-04-26 RX ORDER — LEVOTHYROXINE SODIUM 125 MCG
1 TABLET ORAL
Qty: 0 | Refills: 0 | DISCHARGE

## 2023-04-26 RX ORDER — ACETAMINOPHEN 500 MG
1000 TABLET ORAL ONCE
Refills: 0 | Status: DISCONTINUED | OUTPATIENT
Start: 2023-04-26 | End: 2023-04-26

## 2023-04-26 RX ORDER — POTASSIUM CHLORIDE 20 MEQ
20 PACKET (EA) ORAL
Refills: 0 | Status: COMPLETED | OUTPATIENT
Start: 2023-04-26 | End: 2023-04-26

## 2023-04-26 RX ORDER — SODIUM CHLORIDE 9 MG/ML
1000 INJECTION INTRAMUSCULAR; INTRAVENOUS; SUBCUTANEOUS ONCE
Refills: 0 | Status: COMPLETED | OUTPATIENT
Start: 2023-04-26 | End: 2023-04-26

## 2023-04-26 RX ORDER — SODIUM CHLORIDE 9 MG/ML
1000 INJECTION, SOLUTION INTRAVENOUS
Refills: 0 | Status: DISCONTINUED | OUTPATIENT
Start: 2023-04-26 | End: 2023-04-26

## 2023-04-26 RX ORDER — CEFOTETAN DISODIUM 1 G
2 VIAL (EA) INJECTION ONCE
Refills: 0 | Status: COMPLETED | OUTPATIENT
Start: 2023-04-26 | End: 2023-04-26

## 2023-04-26 RX ORDER — HYDROMORPHONE HYDROCHLORIDE 2 MG/ML
0.5 INJECTION INTRAMUSCULAR; INTRAVENOUS; SUBCUTANEOUS
Refills: 0 | Status: DISCONTINUED | OUTPATIENT
Start: 2023-04-26 | End: 2023-04-26

## 2023-04-26 RX ORDER — SODIUM CHLORIDE 9 MG/ML
1000 INJECTION, SOLUTION INTRAVENOUS ONCE
Refills: 0 | Status: COMPLETED | OUTPATIENT
Start: 2023-04-26 | End: 2023-04-26

## 2023-04-26 RX ORDER — ENOXAPARIN SODIUM 100 MG/ML
40 INJECTION SUBCUTANEOUS EVERY 24 HOURS
Refills: 0 | Status: DISCONTINUED | OUTPATIENT
Start: 2023-04-27 | End: 2023-04-29

## 2023-04-26 RX ADMIN — HYDROMORPHONE HYDROCHLORIDE 0.5 MILLIGRAM(S): 2 INJECTION INTRAMUSCULAR; INTRAVENOUS; SUBCUTANEOUS at 18:31

## 2023-04-26 RX ADMIN — Medication 50 MILLIEQUIVALENT(S): at 10:50

## 2023-04-26 RX ADMIN — Medication 50 MILLIEQUIVALENT(S): at 11:45

## 2023-04-26 RX ADMIN — HYDROMORPHONE HYDROCHLORIDE 0.5 MILLIGRAM(S): 2 INJECTION INTRAMUSCULAR; INTRAVENOUS; SUBCUTANEOUS at 18:53

## 2023-04-26 RX ADMIN — Medication 100 GRAM(S): at 14:50

## 2023-04-26 RX ADMIN — Medication 400 MILLIGRAM(S): at 02:23

## 2023-04-26 RX ADMIN — PIPERACILLIN AND TAZOBACTAM 25 GRAM(S): 4; .5 INJECTION, POWDER, LYOPHILIZED, FOR SOLUTION INTRAVENOUS at 18:08

## 2023-04-26 RX ADMIN — Medication 1000 MILLIGRAM(S): at 21:22

## 2023-04-26 RX ADMIN — PIPERACILLIN AND TAZOBACTAM 25 GRAM(S): 4; .5 INJECTION, POWDER, LYOPHILIZED, FOR SOLUTION INTRAVENOUS at 02:17

## 2023-04-26 RX ADMIN — SODIUM CHLORIDE 1000 MILLILITER(S): 9 INJECTION, SOLUTION INTRAVENOUS at 09:30

## 2023-04-26 RX ADMIN — Medication 800 MILLIGRAM(S): at 03:00

## 2023-04-26 RX ADMIN — Medication 37.5 MICROGRAM(S): at 21:46

## 2023-04-26 RX ADMIN — Medication 50 MILLIEQUIVALENT(S): at 09:06

## 2023-04-26 RX ADMIN — SODIUM CHLORIDE 125 MILLILITER(S): 9 INJECTION, SOLUTION INTRAVENOUS at 18:07

## 2023-04-26 RX ADMIN — Medication 400 MILLIGRAM(S): at 21:01

## 2023-04-26 RX ADMIN — Medication 800 MILLIGRAM(S): at 10:45

## 2023-04-26 RX ADMIN — SODIUM CHLORIDE 1000 MILLILITER(S): 9 INJECTION INTRAMUSCULAR; INTRAVENOUS; SUBCUTANEOUS at 08:49

## 2023-04-26 RX ADMIN — Medication 400 MILLIGRAM(S): at 09:30

## 2023-04-26 RX ADMIN — PANTOPRAZOLE SODIUM 40 MILLIGRAM(S): 20 TABLET, DELAYED RELEASE ORAL at 21:45

## 2023-04-26 NOTE — BRIEF OPERATIVE NOTE - NSICDXBRIEFPROCEDURE_GEN_ALL_CORE_FT
PROCEDURES:  Small bowel resection with anastomosis 26-Apr-2023 18:26:47 laparoscopic Tonya Montiel

## 2023-04-26 NOTE — CHART NOTE - NSCHARTNOTEFT_GEN_A_CORE
RRT called due to Hypotension.  BP was 96/76; HR 92; RR 14: Sat: 98% and in Sinus Rhythm. Lying in bed and RRT triggered during routine BP monitoring. Reported feeling light headed but denied SOB or Chest Pain. Patient was assessed and chart reviewed. Admitted for SBO / Meckel's Diverticulum, remains NPO and scheduled for resection tomorrow. Potassium is being repleted.  Patient has been hovering with this BP since admission and states her normal SBP is in the low 100's outpatient.      PLAN  Dehydration related to acute medical issues   BP remains close to patients baseline and has adequate urine output  Will bolus 2L of NS to start with and re-evaluate  Place patient on Remote Tele   Continue maintenance fluid as per surgery   Remains afebrile and no concern for infection at this time; No Abx   Labs from AM reviewed and electrolytes to be repleted; No need to repeat for now  Surgery and Primary Provider notified   No need to upgrade to SPCU for now but will follow closely and update plan if condition changes

## 2023-04-26 NOTE — PROGRESS NOTE ADULT - NS ATTEND AMEND GEN_ALL_CORE FT
I have personally seen and examined the patient.  I fully participated in the care of this patient.  I have made amendments to the documentation where necessary, and agree with the history, physical exam, impression/assessment, and plan as documented by the PA    SBO 2/2 stricture related to Meckel's diverticulum (likely previous diverticulitis)  Worsening abdominal exam  NGT placed   RRT called for hypotension, BP improved with fluid resuscitation    d/w patient and  risks/benefits/alternatives to laparoscopic small bowel resection to resect area of Meckel's diverticulum and stricture. All questions answered. Consented to proceed with surgery today

## 2023-04-26 NOTE — PROGRESS NOTE ADULT - SUBJECTIVE AND OBJECTIVE BOX
SURGERY PA NOTE ON BEHALF OF DR. MANUEL / GENERAL SURGERY:    S: Patient seen and examined at bedside.     36 yo female pmh of hypothyroidism with sbo and meckles diverticulitis.   No overnight events. States abdominal pain similar to yesterday. Pain medication provides some relief.   Denies n/v. No flatus or BM yet. Urinating and ambulating without issues. Still with dizziness.     Of note: After seeing the pt, a rapid response was called for this pt due to low BP (75/41).   HR in 70s-80s sinus. Denies sob, cp. Admits she has low BP at baseline (unsure of exact numbers).   BP was retaken, with machine and manually which read 90s/60s (which has been her baseline in the hospital).   AM labs are wnl other than low potassium and calcium.     MEDICATIONS:  acetaminophen     Tablet .. 650 milliGRAM(s) Oral every 6 hours PRN  aluminum hydroxide/magnesium hydroxide/simethicone Suspension 30 milliLiter(s) Oral every 4 hours PRN  enoxaparin Injectable 40 milliGRAM(s) SubCutaneous every 24 hours  HYDROmorphone  Injectable 0.5 milliGRAM(s) IV Push every 4 hours PRN  ibuprofen IVPB .. 800 milliGRAM(s) IV Intermittent every 6 hours PRN  lactated ringers Bolus 1000 milliLiter(s) IV Bolus once  lactated ringers. 1000 milliLiter(s) IV Continuous <Continuous>  levothyroxine Injectable 37.5 MICROGram(s) IV Push at bedtime  melatonin 3 milliGRAM(s) Oral at bedtime PRN  ondansetron Injectable 4 milliGRAM(s) IV Push every 8 hours PRN  pantoprazole  Injectable 40 milliGRAM(s) IV Push every 24 hours  piperacillin/tazobactam IVPB.. 3.375 Gram(s) IV Intermittent every 8 hours  potassium chloride  20 mEq/100 mL IVPB 20 milliEquivalent(s) IV Intermittent every 2 hours    O:  Vital Signs Last 24 Hrs  T(C): 37.2 (26 Apr 2023 07:48), Max: 37.2 (26 Apr 2023 07:48)  T(F): 98.9 (26 Apr 2023 07:48), Max: 98.9 (26 Apr 2023 07:48)  HR: 73 (26 Apr 2023 08:18) (65 - 78)  BP: 97/63 (26 Apr 2023 08:18) (75/41 - 99/65)  BP(mean): --  RR: 18 (26 Apr 2023 07:48) (16 - 18)  SpO2: 97% (26 Apr 2023 07:48) (96% - 98%)  Parameters below as of 26 Apr 2023 07:48  Patient On (Oxygen Delivery Method): room air    PHYSICAL EXAM:  Lungs: nonlabored breathing. equal chest expansion  Card: pulse regular. HR 80s in sinus rhythm  Abd: Soft, ND. Tender in periumbilical and epigastric area, same as yesterday. No guarding or rebound tenderness.   Ext: Calves soft, NT, without edema bilat    LABS:                        11.6   9.70  )-----------( 166      ( 26 Apr 2023 06:00 )             35.2     04-26    139  |  103  |  7   ----------------------------<  87  3.1<L>   |  25  |  0.67    Ca    8.0<L>      26 Apr 2023 06:00    TPro  7.3  /  Alb  3.5  /  TBili  0.6  /  DBili  x   /  AST  25  /  ALT  48  /  AlkPhos  88  04-25    PT/INR - ( 26 Apr 2023 06:00 )   PT: 12.1 sec;   INR: 1.05 ratio         PTT - ( 26 Apr 2023 06:00 )  PTT:31.1 sec    RADIOLOGY:  < from: CT Abdomen and Pelvis w/ IV Cont (04.24.23 @ 19:03) >  PROCEDURE DATE:  04/24/2023    INTERPRETATION:  CLINICAL INFORMATION: Severe epigastric pain for one day.  COMPARISON: CT abdomen pelvis dated 1/8/2023.  CONTRAST/COMPLICATIONS:  IV Contrast: Omnipaque 350  90 cc administered   10 cc discarded  Oral Contrast: NONE  Complications: None reported at time of study completion  PROCEDURE:  CT of the Abdomen and Pelvis was performed.  Sagittal and coronal reformats were performed.  FINDINGS:  LOWER CHEST: Within normal limits.  LIVER: Within normal limits.  BILE DUCTS: Normal caliber.  GALLBLADDER: Cholecystectomy.  SPLEEN: Within normal limits.  PANCREAS: Within normal limits.  ADRENALS: Within normal limits.  KIDNEYS/URETERS: Within normal limits.  BLADDER: Nondistended.  REPRODUCTIVE ORGANS: Uterus and adnexa within normal limits.  BOWEL: There is fluid-filled distention of small bowel to a point of   transition in the mid ileum. At thispoint there is a stool-filled   diverticulum followed by a luminal stricture (3, 109; 5, 55). This   finding was present on prior CT in the absence of associated obstruction.   Appendix is normal.  PERITONEUM: No ascites.  VESSELS: Within normal limits.  RETROPERITONEUM/LYMPH NODES: No lymphadenopathy.  ABDOMINAL WALL: Within normal limits.  BONES: Within normal limits.    IMPRESSION:  Mechanical obstruction in the mid ileum secondary to stricture   immediately distal to stool-filled diverticulum(possibly a   postinflammatory stricture secondary to prior episode of Meckel's   diverticulitis).    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  Rapid response due to low BP. Fluid boluses ordered.   AM labs- leukocytosis resolves, no anemia. Hypokalemia    P:  Continue current care  Abd Xray re eval sbo  2L fluid bolus ordered  Increased IVF rate to 125  Repleting lytes  Diet - NPO  GI/DVT prophylaxis  Serial abd exams  Analgesia prn- multimodal   OOB/ambulation on the floor with assistance due to low BP  Incentive spirometry/Cough/Deep breathing exercises  If pt's BP labile and does not respond to IVF, then possible transfer to [facility]  Medicine following    Case & plan discussed with Dr. Manuel, Dr. Meyers, Dr. Villanueva and patient's nurse.       SURGERY PA NOTE ON BEHALF OF DR. MANUEL / GENERAL SURGERY:    S: Patient seen and examined at bedside.     36 yo female pmh of hypothyroidism with sbo and meckles diverticulitis.   No overnight events. States abdominal pain similar to yesterday. Pain medication provides some relief.   Denies n/v. No flatus or BM yet. Urinating and ambulating without issues. Still with dizziness.     Of note: After seeing the pt, a rapid response was called for this pt due to low BP (75/41).   HR in 70s-80s sinus. Denies sob, cp. Admits she has low BP at baseline (unsure of exact numbers).   BP was retaken, with machine and manually which read 90s/60s (which has been her baseline in the hospital).   AM labs are wnl other than low potassium and calcium.     MEDICATIONS:  acetaminophen     Tablet .. 650 milliGRAM(s) Oral every 6 hours PRN  aluminum hydroxide/magnesium hydroxide/simethicone Suspension 30 milliLiter(s) Oral every 4 hours PRN  enoxaparin Injectable 40 milliGRAM(s) SubCutaneous every 24 hours  HYDROmorphone  Injectable 0.5 milliGRAM(s) IV Push every 4 hours PRN  ibuprofen IVPB .. 800 milliGRAM(s) IV Intermittent every 6 hours PRN  lactated ringers Bolus 1000 milliLiter(s) IV Bolus once  lactated ringers. 1000 milliLiter(s) IV Continuous <Continuous>  levothyroxine Injectable 37.5 MICROGram(s) IV Push at bedtime  melatonin 3 milliGRAM(s) Oral at bedtime PRN  ondansetron Injectable 4 milliGRAM(s) IV Push every 8 hours PRN  pantoprazole  Injectable 40 milliGRAM(s) IV Push every 24 hours  piperacillin/tazobactam IVPB.. 3.375 Gram(s) IV Intermittent every 8 hours  potassium chloride  20 mEq/100 mL IVPB 20 milliEquivalent(s) IV Intermittent every 2 hours    O:  Vital Signs Last 24 Hrs  T(C): 37.2 (26 Apr 2023 07:48), Max: 37.2 (26 Apr 2023 07:48)  T(F): 98.9 (26 Apr 2023 07:48), Max: 98.9 (26 Apr 2023 07:48)  HR: 73 (26 Apr 2023 08:18) (65 - 78)  BP: 97/63 (26 Apr 2023 08:18) (75/41 - 99/65)  BP(mean): --  RR: 18 (26 Apr 2023 07:48) (16 - 18)  SpO2: 97% (26 Apr 2023 07:48) (96% - 98%)  Parameters below as of 26 Apr 2023 07:48  Patient On (Oxygen Delivery Method): room air    PHYSICAL EXAM:  Constitutional: No acute distress, resting comfortably  Neuro: Awake and alert  Psych: Calm, affect appropriate  HEENT: Anicteric, no conjunctival injection  Respiratory: Nonlabored, equal chest rise  Cardiovascular: Regular rate and rhythm  GI: Soft, mildly distended, tender in epigastrium, no rebound/guarding  Musculoskeletal: Warm, no edema, no obvious deformity      LABS:                        11.6   9.70  )-----------( 166      ( 26 Apr 2023 06:00 )             35.2     04-26    139  |  103  |  7   ----------------------------<  87  3.1<L>   |  25  |  0.67    Ca    8.0<L>      26 Apr 2023 06:00    TPro  7.3  /  Alb  3.5  /  TBili  0.6  /  DBili  x   /  AST  25  /  ALT  48  /  AlkPhos  88  04-25    PT/INR - ( 26 Apr 2023 06:00 )   PT: 12.1 sec;   INR: 1.05 ratio         PTT - ( 26 Apr 2023 06:00 )  PTT:31.1 sec    RADIOLOGY:  < from: CT Abdomen and Pelvis w/ IV Cont (04.24.23 @ 19:03) >  PROCEDURE DATE:  04/24/2023    INTERPRETATION:  CLINICAL INFORMATION: Severe epigastric pain for one day.  COMPARISON: CT abdomen pelvis dated 1/8/2023.  CONTRAST/COMPLICATIONS:  IV Contrast: Omnipaque 350  90 cc administered   10 cc discarded  Oral Contrast: NONE  Complications: None reported at time of study completion  PROCEDURE:  CT of the Abdomen and Pelvis was performed.  Sagittal and coronal reformats were performed.  FINDINGS:  LOWER CHEST: Within normal limits.  LIVER: Within normal limits.  BILE DUCTS: Normal caliber.  GALLBLADDER: Cholecystectomy.  SPLEEN: Within normal limits.  PANCREAS: Within normal limits.  ADRENALS: Within normal limits.  KIDNEYS/URETERS: Within normal limits.  BLADDER: Nondistended.  REPRODUCTIVE ORGANS: Uterus and adnexa within normal limits.  BOWEL: There is fluid-filled distention of small bowel to a point of   transition in the mid ileum. At thispoint there is a stool-filled   diverticulum followed by a luminal stricture (3, 109; 5, 55). This   finding was present on prior CT in the absence of associated obstruction.   Appendix is normal.  PERITONEUM: No ascites.  VESSELS: Within normal limits.  RETROPERITONEUM/LYMPH NODES: No lymphadenopathy.  ABDOMINAL WALL: Within normal limits.  BONES: Within normal limits.    IMPRESSION:  Mechanical obstruction in the mid ileum secondary to stricture   immediately distal to stool-filled diverticulum(possibly a   postinflammatory stricture secondary to prior episode of Meckel's   diverticulitis).    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  Rapid response due to low BP. Fluid boluses ordered.   AM labs- leukocytosis resolves, no anemia. Hypokalemia    P:  Continue current care  Abd Xray re eval sbo  2L fluid bolus ordered  Increased IVF rate to 125  Repleting lytes  Diet - NPO  GI/DVT prophylaxis  Serial abd exams  Analgesia prn- multimodal   OOB/ambulation on the floor with assistance due to low BP  Incentive spirometry/Cough/Deep breathing exercises  If pt's BP labile and does not respond to IVF, then possible transfer to [facility]  Medicine following    Case & plan discussed with Dr. Manuel, Dr. Meyers, Dr. Villanueva and patient's nurse.

## 2023-04-26 NOTE — BRIEF OPERATIVE NOTE - NSICDXBRIEFPOSTOP_GEN_ALL_CORE_FT
POST-OP DIAGNOSIS:  SBO (small bowel obstruction) 26-Apr-2023 18:24:56  Tonya Montiel  Meckel diverticulum 26-Apr-2023 18:25:20  Tonya Montiel

## 2023-04-26 NOTE — PROGRESS NOTE ADULT - SUBJECTIVE AND OBJECTIVE BOX
Patient is a 37y old  Female who presents with a chief complaint of Abdominal pain and nausea or vomiting. (2023 08:56)    HPI: 37 years old female with past medical history of hypothyroidism, h/o lap tin, who came in to ER last night with 1 day history of nausea, vomiting and epigastric pain. Patient states she had pain occasionally in past but never with vomiting. As she had multiple episode of vomiting, she came in to ER. In ER, patient is noted to have partial small bowel obstruction. She is admitted for further care.     INTERVAL HPI/OVERNIGHT EVENTS:  Chart reviewed, notes reviewed.   Patient seen and examined.  Being followed by following specialists: Surgery     Consultant(s) Notes Reviewed:  [X] Yes    Care Discussed with Consultants/Other Providers: [X] Yes    2023 --> Events noted. Patient had low blood pressure but was otherwise asymptomatic. She received fluid bolus with improvement in her blood pressure. Patient is in preop area for surgery today.      REVIEW OF SYSTEMS:  CONSTITUTIONAL: No fever, weight loss, or fatigue  EYES: No eye pain, or discharge  ENMT: No sinus or throat pain  NECK: No pain or stiffness  BREASTS: No pain, masses, or nipple discharge  RESPIRATORY: No cough, wheezing, chills or hemoptysis; No shortness of breath  CARDIOVASCULAR: No chest pain, palpitations, dizziness, or leg swelling  GASTROINTESTINAL: + abdominal pain. (improved)  GENITOURINARY: No dysuria, frequency, hematuria, or incontinence  NEUROLOGICAL: No loss of strength, numbness, or tremors  SKIN: No itching, burning, rashes, or lesions   LYMPH NODES: No enlarged glands  ENDOCRINE: No polydipsia or polyuria  MUSCULOSKELETAL: No muscle, back, or extremity pain  PSYCHIATRIC: No depression, anxiety, mood swings.  HEME/LYMPH: No easy bruising, or bleeding gums  ALLERGY AND IMMUNOLOGIC: No hives or eczema    Allergies: No Known Allergies    Intolerances      Home Medications:  levothyroxine 75 mcg (0.075 mg) oral tablet: 1 tab(s) orally once a day (2023 11:33)    MEDICATIONS  (STANDING):  cefoTEtan  IVPB 2 Gram(s) IV Intermittent once  enoxaparin Injectable 40 milliGRAM(s) SubCutaneous every 24 hours  lactated ringers. 1000 milliLiter(s) (125 mL/Hr) IV Continuous <Continuous>  levothyroxine Injectable 37.5 MICROGram(s) IV Push at bedtime  pantoprazole  Injectable 40 milliGRAM(s) IV Push every 24 hours  piperacillin/tazobactam IVPB.. 3.375 Gram(s) IV Intermittent every 8 hours    MEDICATIONS  (PRN):  acetaminophen     Tablet .. 650 milliGRAM(s) Oral every 6 hours PRN Temp greater or equal to 38C (100.4F), Mild Pain (1 - 3)  aluminum hydroxide/magnesium hydroxide/simethicone Suspension 30 milliLiter(s) Oral every 4 hours PRN Dyspepsia  HYDROmorphone  Injectable 0.5 milliGRAM(s) IV Push every 4 hours PRN Severe Pain (7 - 10)  ibuprofen IVPB .. 800 milliGRAM(s) IV Intermittent every 6 hours PRN Moderate Pain (4 - 6)  melatonin 3 milliGRAM(s) Oral at bedtime PRN Insomnia  ondansetron Injectable 4 milliGRAM(s) IV Push every 8 hours PRN Nausea and/or Vomiting    Vital Signs Last 24 Hrs  T(C): 36.2 (2023 11:20), Max: 37.2 (2023 07:48)  T(F): 97.2 (2023 11:20), Max: 98.9 (2023 07:48)  HR: 78 (2023 11:20) (65 - 78)  BP: 101/64 (2023 11:20) (75/41 - 101/64)  BP(mean): --  RR: 16 (2023 11:20) (16 - 20)  SpO2: 98% (2023 11:20) (96% - 98%)    Parameters below as of 2023 11:20  Patient On (Oxygen Delivery Method): room air        PHYSICAL EXAM:  GENERAL: NAD, well-groomed, well-developed  HEAD:  Atraumatic, Normocephalic  EYES: EOMI, PERRLA, conjunctiva and sclera clear  ENMT: Moist mucous membranes, no lesions  NECK: Supple.  CHEST/LUNG: Clear to auscultation bilaterally; No rales, rhonchi, wheezing, or rubs  HEART: S1, S2.   ABDOMEN: Epigastric and periumbilical discomfort + BS +  EXTREMITIES:  2+ Peripheral Pulses, No clubbing, cyanosis, or edema  MS: No joint swelling or deformity.   LYMPH: No lymphadenopathy noted  SKIN: No rashes or lesions  NERVOUS SYSTEM:  No focal deficit.   PSYCH:  Awake and alert.   LABS:                         11.6   9.70  )-----------( 166      ( 2023 06:00 )             35.2     2023 06:00    139    |  103    |  7      ----------------------------<  87     3.1     |  25     |  0.67     Ca    8.0        2023 06:00    TPro  7.3    /  Alb  3.5    /  TBili  0.6    /  DBili  x      /  AST  25     /  ALT  48     /  AlkPhos  88     2023 05:48    POCT Blood Glucose.: 85 mg/dL (2023 08:18)    PT/INR - ( 2023 06:00 )   PT: 12.1 sec;   INR: 1.05 ratio         PTT - ( 2023 06:00 )  PTT:31.1 sec   Chol 213<H> LDL -- HDL 50<L> Trig 212<H>    Thyroid Stimulating Hormone, Serum: 1.81 uIU/mL ( @ 05:48)        Culture Results:   <10,000 CFU/mL Normal Urogenital Kalyn ( @ 19:40)           Urinalysis Basic - ( 2023 19:40 )    Color: Yellow / Appearance: Clear / S.005 / pH: x  Gluc: x / Ketone: Moderate  / Bili: Negative / Urobili: Negative mg/dL   Blood: x / Protein: 30 mg/dL / Nitrite: Negative   Leuk Esterase: Negative / RBC: 0-2 /HPF / WBC 0-2   Sq Epi: x / Non Sq Epi: x / Bacteria: Negative          RADIOLOGY TEST: (IMAGES REVIEWED BY ME)    Imaging Personally Reviewed:  [X] YES      HEALTH ISSUES - PROBLEM Dx:  Abdominal pain    Small bowel obstruction    Preoperative clearance    Hypothyroidism    Prophylactic measure

## 2023-04-27 LAB
ANION GAP SERPL CALC-SCNC: 10 MMOL/L — SIGNIFICANT CHANGE UP (ref 5–17)
BUN SERPL-MCNC: 3 MG/DL — LOW (ref 7–23)
CALCIUM SERPL-MCNC: 8.2 MG/DL — LOW (ref 8.4–10.5)
CHLORIDE SERPL-SCNC: 102 MMOL/L — SIGNIFICANT CHANGE UP (ref 96–108)
CO2 SERPL-SCNC: 25 MMOL/L — SIGNIFICANT CHANGE UP (ref 22–31)
CREAT SERPL-MCNC: 0.63 MG/DL — SIGNIFICANT CHANGE UP (ref 0.5–1.3)
EGFR: 116 ML/MIN/1.73M2 — SIGNIFICANT CHANGE UP
GLUCOSE SERPL-MCNC: 118 MG/DL — HIGH (ref 70–99)
HCT VFR BLD CALC: 34.2 % — LOW (ref 34.5–45)
HGB BLD-MCNC: 11.4 G/DL — LOW (ref 11.5–15.5)
MAGNESIUM SERPL-MCNC: 1.7 MG/DL — SIGNIFICANT CHANGE UP (ref 1.6–2.6)
MCHC RBC-ENTMCNC: 28.6 PG — SIGNIFICANT CHANGE UP (ref 27–34)
MCHC RBC-ENTMCNC: 33.3 GM/DL — SIGNIFICANT CHANGE UP (ref 32–36)
MCV RBC AUTO: 85.9 FL — SIGNIFICANT CHANGE UP (ref 80–100)
NRBC # BLD: 0 /100 WBCS — SIGNIFICANT CHANGE UP (ref 0–0)
PHOSPHATE SERPL-MCNC: 3.9 MG/DL — SIGNIFICANT CHANGE UP (ref 2.5–4.5)
PLATELET # BLD AUTO: 171 K/UL — SIGNIFICANT CHANGE UP (ref 150–400)
POTASSIUM SERPL-MCNC: 3.5 MMOL/L — SIGNIFICANT CHANGE UP (ref 3.5–5.3)
POTASSIUM SERPL-SCNC: 3.5 MMOL/L — SIGNIFICANT CHANGE UP (ref 3.5–5.3)
RBC # BLD: 3.98 M/UL — SIGNIFICANT CHANGE UP (ref 3.8–5.2)
RBC # FLD: 13.4 % — SIGNIFICANT CHANGE UP (ref 10.3–14.5)
SODIUM SERPL-SCNC: 137 MMOL/L — SIGNIFICANT CHANGE UP (ref 135–145)
WBC # BLD: 9.81 K/UL — SIGNIFICANT CHANGE UP (ref 3.8–10.5)
WBC # FLD AUTO: 9.81 K/UL — SIGNIFICANT CHANGE UP (ref 3.8–10.5)

## 2023-04-27 RX ADMIN — Medication 1000 MILLIGRAM(S): at 10:51

## 2023-04-27 RX ADMIN — PIPERACILLIN AND TAZOBACTAM 25 GRAM(S): 4; .5 INJECTION, POWDER, LYOPHILIZED, FOR SOLUTION INTRAVENOUS at 10:23

## 2023-04-27 RX ADMIN — Medication 1000 MILLIGRAM(S): at 05:54

## 2023-04-27 RX ADMIN — Medication 400 MILLIGRAM(S): at 15:49

## 2023-04-27 RX ADMIN — ENOXAPARIN SODIUM 40 MILLIGRAM(S): 100 INJECTION SUBCUTANEOUS at 12:57

## 2023-04-27 RX ADMIN — SODIUM CHLORIDE 125 MILLILITER(S): 9 INJECTION, SOLUTION INTRAVENOUS at 21:03

## 2023-04-27 RX ADMIN — Medication 1000 MILLIGRAM(S): at 16:20

## 2023-04-27 RX ADMIN — PANTOPRAZOLE SODIUM 40 MILLIGRAM(S): 20 TABLET, DELAYED RELEASE ORAL at 21:03

## 2023-04-27 RX ADMIN — PIPERACILLIN AND TAZOBACTAM 25 GRAM(S): 4; .5 INJECTION, POWDER, LYOPHILIZED, FOR SOLUTION INTRAVENOUS at 02:09

## 2023-04-27 RX ADMIN — Medication 400 MILLIGRAM(S): at 20:53

## 2023-04-27 RX ADMIN — Medication 800 MILLIGRAM(S): at 21:16

## 2023-04-27 RX ADMIN — Medication 400 MILLIGRAM(S): at 05:46

## 2023-04-27 RX ADMIN — Medication 400 MILLIGRAM(S): at 10:21

## 2023-04-27 RX ADMIN — Medication 37.5 MICROGRAM(S): at 21:02

## 2023-04-27 RX ADMIN — SODIUM CHLORIDE 125 MILLILITER(S): 9 INJECTION, SOLUTION INTRAVENOUS at 12:58

## 2023-04-27 RX ADMIN — SODIUM CHLORIDE 125 MILLILITER(S): 9 INJECTION, SOLUTION INTRAVENOUS at 02:09

## 2023-04-27 RX ADMIN — SODIUM CHLORIDE 125 MILLILITER(S): 9 INJECTION, SOLUTION INTRAVENOUS at 04:00

## 2023-04-27 RX ADMIN — PIPERACILLIN AND TAZOBACTAM 25 GRAM(S): 4; .5 INJECTION, POWDER, LYOPHILIZED, FOR SOLUTION INTRAVENOUS at 17:35

## 2023-04-27 NOTE — PROGRESS NOTE ADULT - NS ATTEND AMEND GEN_ALL_CORE FT
I have personally seen and examined the patient.  I fully participated in the care of this patient.  I have made amendments to the documentation where necessary, and agree with the history, physical exam, impression/assessment, and plan as documented by the PA    Patient feels well POD #1 s/p lap SBR  Abd soft, mildly distended, appropriate miguel incisional tenderness I have personally seen and examined the patient.  I fully participated in the care of this patient.  I have made amendments to the documentation where necessary, and agree with the history, physical exam, impression/assessment, and plan as documented by the PA    Patient feels well POD #1 s/p lap SBR  Abd soft, mildly distended, appropriate miguel-incisional tenderness  No GI function yet  Mild nausea without emesis    -NPO with sips  -await return of GI function  -OOB/ambulate  -multimodal pain control  -IS

## 2023-04-27 NOTE — DIETITIAN INITIAL EVALUATION ADULT - PERTINENT MEDS FT
MEDICATIONS  (STANDING):  acetaminophen   IVPB .. 1000 milliGRAM(s) IV Intermittent every 6 hours  enoxaparin Injectable 40 milliGRAM(s) SubCutaneous every 24 hours  lactated ringers. 1000 milliLiter(s) (125 mL/Hr) IV Continuous <Continuous>  levothyroxine Injectable 37.5 MICROGram(s) IV Push at bedtime  pantoprazole  Injectable 40 milliGRAM(s) IV Push every 24 hours  piperacillin/tazobactam IVPB.. 3.375 Gram(s) IV Intermittent every 8 hours    MEDICATIONS  (PRN):  HYDROmorphone  Injectable 0.5 milliGRAM(s) IV Push every 4 hours PRN Severe Pain (7 - 10)  ibuprofen IVPB .. 800 milliGRAM(s) IV Intermittent every 6 hours PRN Moderate Pain (4 - 6)  ondansetron Injectable 4 milliGRAM(s) IV Push every 8 hours PRN Nausea and/or Vomiting

## 2023-04-27 NOTE — PROGRESS NOTE ADULT - SUBJECTIVE AND OBJECTIVE BOX
SURGERY Progress Note PA    37y  Abdominal pain and nausea/ vomiting -> Meckles Diverticulum -> Laparoscopic SBR and anastomosis  POD #1    SUBJECTIVE:   Patient seen at bedside, no overnight events, no complaints noted and pain is controlled at this time.   Patient admits to flatus, bowel movement.   Patient denies any headaches, chest pain, shortness of breath, nausea, vomiting, fever, chills, weakness, dysuria  Patient A+Ox3 in NAD at time of visit.    OBJECTIVE:   T(C): 36.3 (04-27-23 @ 07:51), Max: 37.2 (04-26-23 @ 09:48)  HR: 71 (04-27-23 @ 07:51) (71 - 86)  BP: 90/54 (04-27-23 @ 07:51) (90/54 - 109/64)  RR: 16 (04-27-23 @ 07:51) (12 - 20)  SpO2: 98% (04-27-23 @ 07:51) (95% - 98%)  Wt(kg): --  CAPILLARY BLOOD GLUCOSE        I&O's Detail    26 Apr 2023 07:01  -  27 Apr 2023 07:00  --------------------------------------------------------  IN:    Lactated Ringers: 900 mL  Total IN: 900 mL    OUT:    Blood Loss (mL): 50 mL    Voided (mL): 700 mL  Total OUT: 750 mL    Total NET: 150 mL          Physical exam:  General: A+O x 3 in NAD  HEENT: PERRLA, EOM intact  Neck: trachea midline  Chest: Clear through auscultation bilaterally, No rales, rhonchi wheezes noted bilaterally  Heart: S1,S1 RRR, no murmurs noted  Abdomen: soft non distended, non tender, non tympanic, BS x 4, no guarding noted  Incision: intact, no erythema noted  Extremities: no edema noted, warm,  no calf tenderness     MEDICATIONS  (STANDING):  acetaminophen   IVPB .. 1000 milliGRAM(s) IV Intermittent every 6 hours  enoxaparin Injectable 40 milliGRAM(s) SubCutaneous every 24 hours  lactated ringers. 1000 milliLiter(s) (125 mL/Hr) IV Continuous <Continuous>  levothyroxine Injectable 37.5 MICROGram(s) IV Push at bedtime  pantoprazole  Injectable 40 milliGRAM(s) IV Push every 24 hours  piperacillin/tazobactam IVPB.. 3.375 Gram(s) IV Intermittent every 8 hours    MEDICATIONS  (PRN):  HYDROmorphone  Injectable 0.5 milliGRAM(s) IV Push every 4 hours PRN Severe Pain (7 - 10)  ibuprofen IVPB .. 800 milliGRAM(s) IV Intermittent every 6 hours PRN Moderate Pain (4 - 6)  ondansetron Injectable 4 milliGRAM(s) IV Push every 8 hours PRN Nausea and/or Vomiting      LABS:                        11.4   9.81  )-----------( 171      ( 27 Apr 2023 06:00 )             34.2     04-27    137  |  102  |  3<L>  ----------------------------<  118<H>  3.5   |  25  |  0.63    Ca    8.2<L>      27 Apr 2023 06:00  Phos  3.9     04-27  Mg     1.7     04-27      PT/INR - ( 26 Apr 2023 06:00 )   PT: 12.1 sec;   INR: 1.05 ratio         PTT - ( 26 Apr 2023 06:00 )  PTT:31.1 sec      RADIOLOGY & ADDITIONAL STUDIES:    Assessment  Patient is a 37y old  Female who presents with a chief complaint of Abdominal pain and nausea or vomiting. (27 Apr 2023 08:37)      Plan      Surgical Team Contact Information     SURGERY Progress Note PA    36y/o female with past medical history of hypothyroidism, h/o lap tin, who came in to ER with 1 day history of nausea, vomiting and epigastric pain. Patient stated she had pain occasionally in past but never with vomiting. On CT noted to have partial small bowel obstruction / Meckles diverticulum.      Abdominal pain and nausea/ vomiting -> Meckles Diverticulum -> Laparoscopic SBR and anastomosis  POD #1    SUBJECTIVE:   Patient seen at bedside, no overnight events, no complaints noted and pain is controlled at this time.   Patient admits to flatus, no bowel movement.   Patient denies any headaches, chest pain, shortness of breath, nausea, vomiting, fever, chills, weakness, dysuria  Patient A+Ox3 in NAD at time of visit.    OBJECTIVE:   T(C): 36.3 (04-27-23 @ 07:51), Max: 37.2 (04-26-23 @ 09:48)  HR: 71 (04-27-23 @ 07:51) (71 - 86)  BP: 90/54 (04-27-23 @ 07:51) (90/54 - 109/64)  RR: 16 (04-27-23 @ 07:51) (12 - 20)  SpO2: 98% (04-27-23 @ 07:51) (95% - 98%)  CAPILLARY BLOOD GLUCOSE    I&O's Detail    26 Apr 2023 07:01  -  27 Apr 2023 07:00  --------------------------------------------------------  IN:    Lactated Ringers: 900 mL  Total IN: 900 mL    OUT:    Blood Loss (mL): 50 mL    Voided (mL): 700 mL  Total OUT: 750 mL    Total NET: 150 mL    Physical exam:  General: A+O x 3 in NAD  HEENT: PERRLA, EOM intact  Neck: trachea midline  Chest: Clear through auscultation bilaterally, No rales, rhonchi wheezes noted bilaterally  Heart: S1,S1 RRR, no murmurs noted  Abdomen: soft non distended, tender at incision sites, BS x 4  Incisions: intact, no erythema noted - Dermabond  Extremities: no edema noted, warm,  no calf tenderness     MEDICATIONS  (STANDING):  acetaminophen   IVPB .. 1000 milliGRAM(s) IV Intermittent every 6 hours  enoxaparin Injectable 40 milliGRAM(s) SubCutaneous every 24 hours  lactated ringers. 1000 milliLiter(s) (125 mL/Hr) IV Continuous <Continuous>  levothyroxine Injectable 37.5 MICROGram(s) IV Push at bedtime  pantoprazole  Injectable 40 milliGRAM(s) IV Push every 24 hours  piperacillin/tazobactam IVPB.. 3.375 Gram(s) IV Intermittent every 8 hours    MEDICATIONS  (PRN):  HYDROmorphone  Injectable 0.5 milliGRAM(s) IV Push every 4 hours PRN Severe Pain (7 - 10)  ibuprofen IVPB .. 800 milliGRAM(s) IV Intermittent every 6 hours PRN Moderate Pain (4 - 6)  ondansetron Injectable 4 milliGRAM(s) IV Push every 8 hours PRN Nausea and/or Vomiting      LABS:                        11.4   9.81  )-----------( 171      ( 27 Apr 2023 06:00 )             34.2     04-27    137  |  102  |  3<L>  ----------------------------<  118<H>  3.5   |  25  |  0.63    Ca    8.2<L>      27 Apr 2023 06:00  Phos  3.9     04-27  Mg     1.7     04-27      PT/INR - ( 26 Apr 2023 06:00 )   PT: 12.1 sec;   INR: 1.05 ratio         PTT - ( 26 Apr 2023 06:00 )  PTT:31.1 sec      RADIOLOGY & ADDITIONAL STUDIES:    Assessmen/Plan  Patient is a 37y old female who presents with a chief complaint of Abdominal pain and nausea or vomiting.      SURGERY Progress Note PA    36y/o female with past medical history of hypothyroidism, h/o lap tin, who came in to ER with 1 day history of nausea, vomiting and epigastric pain. Patient stated she had pain occasionally in past but never with vomiting. On CT noted to have partial small bowel obstruction / Meckles diverticulum.      Abdominal pain and nausea/ vomiting -> Meckles Diverticulum -> Laparoscopic SBR and anastomosis  POD #1    SUBJECTIVE:   Patient seen at bedside, no overnight events, no complaints noted and pain is controlled at this time.   Patient admits to flatus, no bowel movement.   Patient denies any headaches, chest pain, shortness of breath, nausea, vomiting, fever, chills, weakness, dysuria  Patient A+Ox3 in NAD at time of visit.    OBJECTIVE:   T(C): 36.3 (04-27-23 @ 07:51), Max: 37.2 (04-26-23 @ 09:48)  HR: 71 (04-27-23 @ 07:51) (71 - 86)  BP: 90/54 (04-27-23 @ 07:51) (90/54 - 109/64)  RR: 16 (04-27-23 @ 07:51) (12 - 20)  SpO2: 98% (04-27-23 @ 07:51) (95% - 98%)  CAPILLARY BLOOD GLUCOSE    I&O's Detail    26 Apr 2023 07:01  -  27 Apr 2023 07:00  --------------------------------------------------------  IN:    Lactated Ringers: 900 mL  Total IN: 900 mL    OUT:    Blood Loss (mL): 50 mL    Voided (mL): 700 mL  Total OUT: 750 mL    Total NET: 150 mL    Physical exam:  General: A+O x 3 in NAD  HEENT: PERRLA, EOM intact  Neck: trachea midline  Chest: Clear through auscultation bilaterally, No rales, rhonchi wheezes noted bilaterally  Heart: S1,S1 RRR, no murmurs noted  Abdomen: soft non distended, tender at incision sites, BS x 4  Incisions: intact, no erythema noted - Dermabond  Extremities: no edema noted, warm,  no calf tenderness     MEDICATIONS  (STANDING):  acetaminophen   IVPB .. 1000 milliGRAM(s) IV Intermittent every 6 hours  enoxaparin Injectable 40 milliGRAM(s) SubCutaneous every 24 hours  lactated ringers. 1000 milliLiter(s) (125 mL/Hr) IV Continuous <Continuous>  levothyroxine Injectable 37.5 MICROGram(s) IV Push at bedtime  pantoprazole  Injectable 40 milliGRAM(s) IV Push every 24 hours  piperacillin/tazobactam IVPB.. 3.375 Gram(s) IV Intermittent every 8 hours    MEDICATIONS  (PRN):  HYDROmorphone  Injectable 0.5 milliGRAM(s) IV Push every 4 hours PRN Severe Pain (7 - 10)  ibuprofen IVPB .. 800 milliGRAM(s) IV Intermittent every 6 hours PRN Moderate Pain (4 - 6)  ondansetron Injectable 4 milliGRAM(s) IV Push every 8 hours PRN Nausea and/or Vomiting      LABS:                        11.4   9.81  )-----------( 171      ( 27 Apr 2023 06:00 )             34.2     04-27    137  |  102  |  3<L>  ----------------------------<  118<H>  3.5   |  25  |  0.63    Ca    8.2<L>      27 Apr 2023 06:00  Phos  3.9     04-27  Mg     1.7     04-27      PT/INR - ( 26 Apr 2023 06:00 )   PT: 12.1 sec;   INR: 1.05 ratio         PTT - ( 26 Apr 2023 06:00 )  PTT:31.1 sec      RADIOLOGY & ADDITIONAL STUDIES:    Assessment / Plan  Patient is a 37y old female who presented with a chief complaint of Abdominal pain. nausea/ vomiting -> Meckles Diverticulum -> Laparoscopic SBR and anastomosis   Continue current care  IVF  IV ABX  Synthroid  Diet - clears?  GI/DVT prophylaxis  Analgesia prn  Labs in AM  OOB/ambulation on the floor   Incentive spirometry/Cough/Deep breathing exercises  Sequentials  d/c begin planning to home  Texted Dr. Manuel        SURGERY Progress Note PA    36y/o female with past medical history of hypothyroidism, h/o lap tin, who came in to ER with 1 day history of nausea, vomiting and epigastric pain. Patient stated she had pain occasionally in past but never with vomiting. On CT noted to have partial small bowel obstruction / Meckles diverticulum.      Abdominal pain and nausea/ vomiting -> Meckles Diverticulum -> Laparoscopic SBR and anastomosis  POD #1    SUBJECTIVE:   Patient seen at bedside, no overnight events, no complaints noted and pain is controlled at this time.   Patient admits to flatus, no bowel movement.   Patient denies any headaches, chest pain, shortness of breath, nausea, vomiting, fever, chills, weakness, dysuria  Patient A+Ox3 in NAD at time of visit.    OBJECTIVE:   T(C): 36.3 (04-27-23 @ 07:51), Max: 37.2 (04-26-23 @ 09:48)  HR: 71 (04-27-23 @ 07:51) (71 - 86)  BP: 90/54 (04-27-23 @ 07:51) (90/54 - 109/64)  RR: 16 (04-27-23 @ 07:51) (12 - 20)  SpO2: 98% (04-27-23 @ 07:51) (95% - 98%)  CAPILLARY BLOOD GLUCOSE    I&O's Detail    26 Apr 2023 07:01  -  27 Apr 2023 07:00  --------------------------------------------------------  IN:    Lactated Ringers: 900 mL  Total IN: 900 mL    OUT:    Blood Loss (mL): 50 mL    Voided (mL): 700 mL  Total OUT: 750 mL    Total NET: 150 mL    Physical exam:  General: A+O x 3 in NAD  HEENT: PERRLA, EOM intact  Neck: trachea midline  Chest: Clear through auscultation bilaterally, No rales, rhonchi wheezes noted bilaterally  Heart: S1,S1 RRR, no murmurs noted  Abdomen: soft non distended, tender at incision sites, BS x 4  Incisions: intact, no erythema noted - Dermabond  Extremities: no edema noted, warm,  no calf tenderness     MEDICATIONS  (STANDING):  acetaminophen   IVPB .. 1000 milliGRAM(s) IV Intermittent every 6 hours  enoxaparin Injectable 40 milliGRAM(s) SubCutaneous every 24 hours  lactated ringers. 1000 milliLiter(s) (125 mL/Hr) IV Continuous <Continuous>  levothyroxine Injectable 37.5 MICROGram(s) IV Push at bedtime  pantoprazole  Injectable 40 milliGRAM(s) IV Push every 24 hours  piperacillin/tazobactam IVPB.. 3.375 Gram(s) IV Intermittent every 8 hours    MEDICATIONS  (PRN):  HYDROmorphone  Injectable 0.5 milliGRAM(s) IV Push every 4 hours PRN Severe Pain (7 - 10)  ibuprofen IVPB .. 800 milliGRAM(s) IV Intermittent every 6 hours PRN Moderate Pain (4 - 6)  ondansetron Injectable 4 milliGRAM(s) IV Push every 8 hours PRN Nausea and/or Vomiting      LABS:                        11.4   9.81  )-----------( 171      ( 27 Apr 2023 06:00 )             34.2     04-27    137  |  102  |  3<L>  ----------------------------<  118<H>  3.5   |  25  |  0.63    Ca    8.2<L>      27 Apr 2023 06:00  Phos  3.9     04-27  Mg     1.7     04-27      PT/INR - ( 26 Apr 2023 06:00 )   PT: 12.1 sec;   INR: 1.05 ratio         PTT - ( 26 Apr 2023 06:00 )  PTT:31.1 sec      RADIOLOGY & ADDITIONAL STUDIES:    Assessment / Plan  Patient is a 37y old female who presented with a chief complaint of Abdominal pain. nausea/ vomiting -> Meckles Diverticulum -> Laparoscopic SBR and anastomosis   POD #1  Continue current care  IVF  IV ABX  Synthroid  Diet - clears?  GI/DVT prophylaxis  Analgesia prn  Labs in AM  OOB/ambulation on the floor   Incentive spirometry/Cough/Deep breathing exercises  Sequentials  d/c begin planning to home  Texted Dr. Manuel        SURGERY Progress Note PA    36y/o female with past medical history of hypothyroidism, h/o lap tin, who came in to ER with 1 day history of nausea, vomiting and epigastric pain. Patient stated she had pain occasionally in past but never with vomiting. On CT noted to have partial small bowel obstruction / Meckles diverticulum.      Abdominal pain and nausea/ vomiting -> Meckles Diverticulum -> Laparoscopic SBR and anastomosis  POD #1    SUBJECTIVE:   Patient seen at bedside, no overnight events, no complaints noted and pain is controlled at this time.   Patient denies flatus, no bowel movement. Admits to feeling gassy.  Patient denies any headaches, chest pain, shortness of breath, nausea, vomiting, fever, chills, weakness, dysuria  Patient A+Ox3 in NAD at time of visit.    OBJECTIVE:   T(C): 36.3 (04-27-23 @ 07:51), Max: 37.2 (04-26-23 @ 09:48)  HR: 71 (04-27-23 @ 07:51) (71 - 86)  BP: 90/54 (04-27-23 @ 07:51) (90/54 - 109/64)  RR: 16 (04-27-23 @ 07:51) (12 - 20)  SpO2: 98% (04-27-23 @ 07:51) (95% - 98%)  CAPILLARY BLOOD GLUCOSE    I&O's Detail    26 Apr 2023 07:01  -  27 Apr 2023 07:00  --------------------------------------------------------  IN:    Lactated Ringers: 900 mL  Total IN: 900 mL    OUT:    Blood Loss (mL): 50 mL    Voided (mL): 700 mL  Total OUT: 750 mL    Total NET: 150 mL    Physical exam:  General: A+O x 3 in NAD  HEENT: PERRLA, EOM intact  Neck: trachea midline  Chest: Clear through auscultation bilaterally, No rales, rhonchi wheezes noted bilaterally  Heart: S1,S1 RRR, no murmurs noted  Abdomen: soft non distended, tender at incision sites, BS x 4  Incisions: intact, no erythema noted - Dermabond  Extremities: no edema noted, warm,  no calf tenderness     MEDICATIONS  (STANDING):  acetaminophen   IVPB .. 1000 milliGRAM(s) IV Intermittent every 6 hours  enoxaparin Injectable 40 milliGRAM(s) SubCutaneous every 24 hours  lactated ringers. 1000 milliLiter(s) (125 mL/Hr) IV Continuous <Continuous>  levothyroxine Injectable 37.5 MICROGram(s) IV Push at bedtime  pantoprazole  Injectable 40 milliGRAM(s) IV Push every 24 hours  piperacillin/tazobactam IVPB.. 3.375 Gram(s) IV Intermittent every 8 hours    MEDICATIONS  (PRN):  HYDROmorphone  Injectable 0.5 milliGRAM(s) IV Push every 4 hours PRN Severe Pain (7 - 10)  ibuprofen IVPB .. 800 milliGRAM(s) IV Intermittent every 6 hours PRN Moderate Pain (4 - 6)  ondansetron Injectable 4 milliGRAM(s) IV Push every 8 hours PRN Nausea and/or Vomiting      LABS:                        11.4   9.81  )-----------( 171      ( 27 Apr 2023 06:00 )             34.2     04-27    137  |  102  |  3<L>  ----------------------------<  118<H>  3.5   |  25  |  0.63    Ca    8.2<L>      27 Apr 2023 06:00  Phos  3.9     04-27  Mg     1.7     04-27      PT/INR - ( 26 Apr 2023 06:00 )   PT: 12.1 sec;   INR: 1.05 ratio         PTT - ( 26 Apr 2023 06:00 )  PTT:31.1 sec      RADIOLOGY & ADDITIONAL STUDIES:    Assessment / Plan  Patient is a 37y old female who presented with a chief complaint of Abdominal pain. nausea/ vomiting -> Meckles Diverticulum -> Laparoscopic SBR and anastomosis   POD #1  Continue current care  IVF  IV ABX  Synthroid  Diet - clears?  GI/DVT prophylaxis  Analgesia prn  Labs in AM  OOB/ambulation on the floor   Incentive spirometry/Cough/Deep breathing exercises  Sequentials  d/c begin planning to home  Texted Dr. Manuel        SURGERY Progress Note PA    36y/o female with past medical history of hypothyroidism, h/o lap tin, who came in to ER with 1 day history of nausea, vomiting and epigastric pain. Patient stated she had pain occasionally in past but never with vomiting. On CT noted to have partial small bowel obstruction / Meckles diverticulum.      Abdominal pain and nausea/ vomiting -> Meckles Diverticulum -> Laparoscopic SBR and anastomosis  POD #1    SUBJECTIVE:   Patient seen at bedside, no overnight events, no complaints noted and pain is controlled at this time.   Patient denies flatus, no bowel movement. Admits to feeling gassy.  Patient denies any headaches, chest pain, shortness of breath, nausea, vomiting, fever, chills, weakness, dysuria  Patient A+Ox3 in NAD at time of visit.    OBJECTIVE:   T(C): 36.3 (04-27-23 @ 07:51), Max: 37.2 (04-26-23 @ 09:48)  HR: 71 (04-27-23 @ 07:51) (71 - 86)  BP: 90/54 (04-27-23 @ 07:51) (90/54 - 109/64)  RR: 16 (04-27-23 @ 07:51) (12 - 20)  SpO2: 98% (04-27-23 @ 07:51) (95% - 98%)  CAPILLARY BLOOD GLUCOSE    I&O's Detail    26 Apr 2023 07:01  -  27 Apr 2023 07:00  --------------------------------------------------------  IN:    Lactated Ringers: 900 mL  Total IN: 900 mL    OUT:    Blood Loss (mL): 50 mL    Voided (mL): 700 mL  Total OUT: 750 mL    Total NET: 150 mL    Physical exam:  General: A+O x 3 in NAD  HEENT: PERRLA, EOM intact  Neck: trachea midline  Chest: Clear through auscultation bilaterally, No rales, rhonchi wheezes noted bilaterally  Heart: S1,S1 RRR, no murmurs noted  Abdomen: soft non distended, tender at incision sites, BS x 4  Incisions: intact, no erythema noted - Dermabond  Extremities: no edema noted, warm,  no calf tenderness     MEDICATIONS  (STANDING):  acetaminophen   IVPB .. 1000 milliGRAM(s) IV Intermittent every 6 hours  enoxaparin Injectable 40 milliGRAM(s) SubCutaneous every 24 hours  lactated ringers. 1000 milliLiter(s) (125 mL/Hr) IV Continuous <Continuous>  levothyroxine Injectable 37.5 MICROGram(s) IV Push at bedtime  pantoprazole  Injectable 40 milliGRAM(s) IV Push every 24 hours  piperacillin/tazobactam IVPB.. 3.375 Gram(s) IV Intermittent every 8 hours    MEDICATIONS  (PRN):  HYDROmorphone  Injectable 0.5 milliGRAM(s) IV Push every 4 hours PRN Severe Pain (7 - 10)  ibuprofen IVPB .. 800 milliGRAM(s) IV Intermittent every 6 hours PRN Moderate Pain (4 - 6)  ondansetron Injectable 4 milliGRAM(s) IV Push every 8 hours PRN Nausea and/or Vomiting      LABS:                        11.4   9.81  )-----------( 171      ( 27 Apr 2023 06:00 )             34.2     04-27    137  |  102  |  3<L>  ----------------------------<  118<H>  3.5   |  25  |  0.63    Ca    8.2<L>      27 Apr 2023 06:00  Phos  3.9     04-27  Mg     1.7     04-27      PT/INR - ( 26 Apr 2023 06:00 )   PT: 12.1 sec;   INR: 1.05 ratio         PTT - ( 26 Apr 2023 06:00 )  PTT:31.1 sec      RADIOLOGY & ADDITIONAL STUDIES:    Assessment / Plan  Patient is a 37y old female who presented with a chief complaint of Abdominal pain. nausea/ vomiting -> Meckles Diverticulum -> Laparoscopic SBR and anastomosis   POD #1  Continue current care  IVF  IV ABX  Synthroid  Diet -NPO  GI/DVT prophylaxis  Analgesia prn  Labs in AM  OOB/ambulation on the floor   Incentive spirometry/Cough/Deep breathing exercises  Sequentials  d/c begin planning to home  Texted Dr. Manuel        SURGERY Progress Note PA    s/p Laparoscopic SBR and anastomosis POD #1    SUBJECTIVE:   Patient seen at bedside, no overnight events, no complaints noted and pain is controlled at this time.   Patient denies flatus, no bowel movement. Admits to feeling gassy.  Patient denies any headaches, chest pain, shortness of breath, nausea, vomiting, fever, chills, weakness, dysuria  Patient A+Ox3 in NAD at time of visit.    OBJECTIVE:   T(C): 36.3 (04-27-23 @ 07:51), Max: 37.2 (04-26-23 @ 09:48)  HR: 71 (04-27-23 @ 07:51) (71 - 86)  BP: 90/54 (04-27-23 @ 07:51) (90/54 - 109/64)  RR: 16 (04-27-23 @ 07:51) (12 - 20)  SpO2: 98% (04-27-23 @ 07:51) (95% - 98%)  CAPILLARY BLOOD GLUCOSE    I&O's Detail    26 Apr 2023 07:01  -  27 Apr 2023 07:00  --------------------------------------------------------  IN:    Lactated Ringers: 900 mL  Total IN: 900 mL    OUT:    Blood Loss (mL): 50 mL    Voided (mL): 700 mL  Total OUT: 750 mL    Total NET: 150 mL    Physical exam:  General: A+O x 3 in NAD  HEENT: PERRLA, EOM intact  Neck: trachea midline  Chest: Clear through auscultation bilaterally, No rales, rhonchi wheezes noted bilaterally  Heart: S1,S1 RRR, no murmurs noted  Abdomen: soft non distended, tender at incision sites  Incisions: intact, no erythema noted - Dermabond  Extremities: no edema noted, warm,  no calf tenderness     MEDICATIONS  (STANDING):  acetaminophen   IVPB .. 1000 milliGRAM(s) IV Intermittent every 6 hours  enoxaparin Injectable 40 milliGRAM(s) SubCutaneous every 24 hours  lactated ringers. 1000 milliLiter(s) (125 mL/Hr) IV Continuous <Continuous>  levothyroxine Injectable 37.5 MICROGram(s) IV Push at bedtime  pantoprazole  Injectable 40 milliGRAM(s) IV Push every 24 hours  piperacillin/tazobactam IVPB.. 3.375 Gram(s) IV Intermittent every 8 hours    MEDICATIONS  (PRN):  HYDROmorphone  Injectable 0.5 milliGRAM(s) IV Push every 4 hours PRN Severe Pain (7 - 10)  ibuprofen IVPB .. 800 milliGRAM(s) IV Intermittent every 6 hours PRN Moderate Pain (4 - 6)  ondansetron Injectable 4 milliGRAM(s) IV Push every 8 hours PRN Nausea and/or Vomiting      LABS:                        11.4   9.81  )-----------( 171      ( 27 Apr 2023 06:00 )             34.2     04-27    137  |  102  |  3<L>  ----------------------------<  118<H>  3.5   |  25  |  0.63    Ca    8.2<L>      27 Apr 2023 06:00  Phos  3.9     04-27  Mg     1.7     04-27      PT/INR - ( 26 Apr 2023 06:00 )   PT: 12.1 sec;   INR: 1.05 ratio    PTT - ( 26 Apr 2023 06:00 )  PTT:31.1 sec      Assessment / Plan  Patient is a 37y old female who presented with SBO 2/2 inflammatory stricture associated with Meckel's Diverticulum now POD#1 s/p Laparoscopic SBR and anastomosis   IVF  IV ABX  Synthroid  Diet -NPO  GI/DVT prophylaxis  Analgesia   Labs in AM  OOB/ambulation on the floor   Incentive spirometry/Cough/Deep breathing exercises  Sequentials  d/c begin planning to home  Texted Dr. Manuel        SURGERY Progress Note PA    s/p Laparoscopic SBR and anastomosis POD #1    SUBJECTIVE:   Patient seen at bedside, no overnight events, no complaints noted and pain is controlled at this time.   Patient denies flatus, no bowel movement. Admits to feeling gassy.  Patient denies any headaches, chest pain, shortness of breath, nausea, vomiting, fever, chills, weakness, dysuria  Patient A+Ox3 in NAD at time of visit.    OBJECTIVE:   T(C): 36.3 (04-27-23 @ 07:51), Max: 37.2 (04-26-23 @ 09:48)  HR: 71 (04-27-23 @ 07:51) (71 - 86)  BP: 90/54 (04-27-23 @ 07:51) (90/54 - 109/64)  RR: 16 (04-27-23 @ 07:51) (12 - 20)  SpO2: 98% (04-27-23 @ 07:51) (95% - 98%)  CAPILLARY BLOOD GLUCOSE    I&O's Detail    26 Apr 2023 07:01  -  27 Apr 2023 07:00  --------------------------------------------------------  IN:    Lactated Ringers: 900 mL  Total IN: 900 mL    OUT:+    Blood Loss (mL): 50 mL    Voided (mL): 700 mL  Total OUT: 750 mL    Total NET: 150 mL    Physical exam:  General: A+O x 3 in NAD  HEENT: PERRLA, EOM intact  Neck: trachea midline  Chest: Clear through auscultation bilaterally, No rales, rhonchi wheezes noted bilaterally  Heart: S1,S1 RRR, no murmurs noted  Abdomen: soft non distended, tender at incision sites  Incisions: intact, no erythema noted - Dermabond  Extremities: no edema noted, warm,  no calf tenderness     MEDICATIONS  (STANDING):  acetaminophen   IVPB .. 1000 milliGRAM(s) IV Intermittent every 6 hours  enoxaparin Injectable 40 milliGRAM(s) SubCutaneous every 24 hours  lactated ringers. 1000 milliLiter(s) (125 mL/Hr) IV Continuous <Continuous>  levothyroxine Injectable 37.5 MICROGram(s) IV Push at bedtime  pantoprazole  Injectable 40 milliGRAM(s) IV Push every 24 hours  piperacillin/tazobactam IVPB.. 3.375 Gram(s) IV Intermittent every 8 hours    MEDICATIONS  (PRN):  HYDROmorphone  Injectable 0.5 milliGRAM(s) IV Push every 4 hours PRN Severe Pain (7 - 10)  ibuprofen IVPB .. 800 milliGRAM(s) IV Intermittent every 6 hours PRN Moderate Pain (4 - 6)  ondansetron Injectable 4 milliGRAM(s) IV Push every 8 hours PRN Nausea and/or Vomiting      LABS:                        11.4   9.81  )-----------( 171      ( 27 Apr 2023 06:00 )             34.2     04-27    137  |  102  |  3<L>  ----------------------------<  118<H>  3.5   |  25  |  0.63    Ca    8.2<L>      27 Apr 2023 06:00  Phos  3.9     04-27  Mg     1.7     04-27      PT/INR - ( 26 Apr 2023 06:00 )   PT: 12.1 sec;   INR: 1.05 ratio    PTT - ( 26 Apr 2023 06:00 )  PTT:31.1 sec      Assessment / Plan  Patient is a 37y old female who presented with SBO 2/2 inflammatory stricture associated with Meckel's Diverticulum now POD#1 s/p Laparoscopic SBR and anastomosis   IVF  IV ABX  Synthroid  Diet -NPO  GI/DVT prophylaxis  Analgesia   Labs in AM  OOB/ambulation on the floor   Incentive spirometry/Cough/Deep breathing exercises  Sequentials  d/c begin planning to home  Texted Dr. Manuel        SURGERY Progress Note PA    s/p Laparoscopic SBR and anastomosis POD #1    SUBJECTIVE:   Patient seen at bedside, no overnight events, no complaints noted and pain is controlled at this time.   Patient denies flatus, no bowel movement. Admits to feeling gassy.  Patient denies any headaches, chest pain, shortness of breath, nausea, vomiting, fever, chills, weakness, dysuria  Patient A+Ox3 in NAD at time of visit.    OBJECTIVE:   T(C): 36.3 (04-27-23 @ 07:51), Max: 37.2 (04-26-23 @ 09:48)  HR: 71 (04-27-23 @ 07:51) (71 - 86)  BP: 90/54 (04-27-23 @ 07:51) (90/54 - 109/64)  RR: 16 (04-27-23 @ 07:51) (12 - 20)  SpO2: 98% (04-27-23 @ 07:51) (95% - 98%)  CAPILLARY BLOOD GLUCOSE    I&O's Detail    26 Apr 2023 07:01  -  27 Apr 2023 07:00  --------------------------------------------------------  IN:    Lactated Ringers: 900 mL  Total IN: 900 mL    OUT:+    Blood Loss (mL): 50 mL    Voided (mL): 700 mL  Total OUT: 750 mL    Total NET: 150 mL    Physical exam:  General: A+O x 3 in NAD  HEENT: PERRLA, EOM intact  Neck: trachea midline  Chest: Clear through auscultation bilaterally, No rales, rhonchi wheezes noted bilaterally  Heart: S1,S1 RRR, no murmurs noted  Abdomen: soft non distended, tender at incision sites  Incisions: intact, no erythema noted - Dermabond  Extremities: no edema noted, warm,  no calf tenderness     MEDICATIONS  (STANDING):  acetaminophen   IVPB .. 1000 milliGRAM(s) IV Intermittent every 6 hours  enoxaparin Injectable 40 milliGRAM(s) SubCutaneous every 24 hours  lactated ringers. 1000 milliLiter(s) (125 mL/Hr) IV Continuous <Continuous>  levothyroxine Injectable 37.5 MICROGram(s) IV Push at bedtime  pantoprazole  Injectable 40 milliGRAM(s) IV Push every 24 hours  piperacillin/tazobactam IVPB.. 3.375 Gram(s) IV Intermittent every 8 hours    MEDICATIONS  (PRN):  HYDROmorphone  Injectable 0.5 milliGRAM(s) IV Push every 4 hours PRN Severe Pain (7 - 10)  ibuprofen IVPB .. 800 milliGRAM(s) IV Intermittent every 6 hours PRN Moderate Pain (4 - 6)  ondansetron Injectable 4 milliGRAM(s) IV Push every 8 hours PRN Nausea and/or Vomiting      LABS:                        11.4   9.81  )-----------( 171      ( 27 Apr 2023 06:00 )             34.2     04-27    137  |  102  |  3<L>  ----------------------------<  118<H>  3.5   |  25  |  0.63    Ca    8.2<L>      27 Apr 2023 06:00  Phos  3.9     04-27  Mg     1.7     04-27      PT/INR - ( 26 Apr 2023 06:00 )   PT: 12.1 sec;   INR: 1.05 ratio    PTT - ( 26 Apr 2023 06:00 )  PTT:31.1 sec      Assessment / Plan  Patient is a 37y old female who presented with SBO 2/2 inflammatory stricture associated with Meckel's Diverticulum now POD#1 s/p Laparoscopic SBR and anastomosis   IVF  IV ABX  Synthroid  Diet -NPO  GI/DVT prophylaxis  Analgesia   Labs in AM  OOB/ambulation on the floor   Incentive spirometry/Cough/Deep breathing exercises  Sequentials  d/c begin planning to home  Discussed w/ Dr. Manuel

## 2023-04-27 NOTE — PROGRESS NOTE ADULT - SUBJECTIVE AND OBJECTIVE BOX
Patient is a 37y old  Female who presents with a chief complaint of Abdominal pain and nausea or vomiting. (26 Apr 2023 08:56)    HPI: 37 years old female with past medical history of hypothyroidism, h/o lap tin, who came in to ER last night with 1 day history of nausea, vomiting and epigastric pain. Patient states she had pain occasionally in past but never with vomiting. As she had multiple episode of vomiting, she came in to ER. In ER, patient is noted to have partial small bowel obstruction. She is admitted for further care.     INTERVAL HPI/OVERNIGHT EVENTS:  Chart reviewed, notes reviewed.   Patient seen and examined.  Being followed by following specialists: Surgery     Consultant(s) Notes Reviewed:  [X] Yes    Care Discussed with Consultants/Other Providers: [X] Yes    04/26/2023 --> Events noted. Patient had low blood pressure but was otherwise asymptomatic. She received fluid bolus with improvement in her blood pressure. Patient is in preop area for surgery today.      04/27/2023 --> Patient is s/p Laparoscopic SBR and anastomosis yesterday. Doing well. Pain is controlled. No Flatus or BM yet. Mild nausea, no vomiting. No fever or chills.      REVIEW OF SYSTEMS:  CONSTITUTIONAL: No fever, weight loss, or fatigue  EYES: No eye pain, or discharge  ENMT: No sinus or throat pain  NECK: No pain or stiffness  BREASTS: No pain, masses, or nipple discharge  RESPIRATORY: No cough, wheezing, chills or hemoptysis; No shortness of breath  CARDIOVASCULAR: No chest pain, palpitations, dizziness, or leg swelling  GASTROINTESTINAL: + abdominal pain. (improved)  GENITOURINARY: No dysuria, frequency, hematuria, or incontinence  NEUROLOGICAL: No loss of strength, numbness, or tremors  SKIN: No itching, burning, rashes, or lesions   LYMPH NODES: No enlarged glands  ENDOCRINE: No polydipsia or polyuria  MUSCULOSKELETAL: No muscle, back, or extremity pain  PSYCHIATRIC: No depression, anxiety, mood swings.  HEME/LYMPH: No easy bruising, or bleeding gums  ALLERGY AND IMMUNOLOGIC: No hives or eczema    Allergies: No Known Allergies    Intolerances      Home Medications:  levothyroxine 75 mcg (0.075 mg) oral tablet: 1 tab(s) orally once a day (26 Apr 2023 11:33)    MEDICATIONS  (STANDING):  acetaminophen   IVPB .. 1000 milliGRAM(s) IV Intermittent every 6 hours  enoxaparin Injectable 40 milliGRAM(s) SubCutaneous every 24 hours  lactated ringers. 1000 milliLiter(s) (125 mL/Hr) IV Continuous <Continuous>  levothyroxine Injectable 37.5 MICROGram(s) IV Push at bedtime  pantoprazole  Injectable 40 milliGRAM(s) IV Push every 24 hours  piperacillin/tazobactam IVPB.. 3.375 Gram(s) IV Intermittent every 8 hours    MEDICATIONS  (PRN):  HYDROmorphone  Injectable 0.5 milliGRAM(s) IV Push every 4 hours PRN Severe Pain (7 - 10)  ibuprofen IVPB .. 800 milliGRAM(s) IV Intermittent every 6 hours PRN Moderate Pain (4 - 6)  ondansetron Injectable 4 milliGRAM(s) IV Push every 8 hours PRN Nausea and/or Vomiting    Vital Signs Last 24 Hrs  T(C): 36.3 (27 Apr 2023 07:51), Max: 36.7 (26 Apr 2023 23:30)  T(F): 97.3 (27 Apr 2023 07:51), Max: 98.1 (26 Apr 2023 23:30)  HR: 71 (27 Apr 2023 07:51) (71 - 86)  BP: 90/54 (27 Apr 2023 07:51) (90/54 - 109/64)  BP(mean): --  RR: 16 (27 Apr 2023 07:51) (12 - 18)  SpO2: 98% (27 Apr 2023 07:51) (95% - 98%)    Parameters below as of 27 Apr 2023 07:51  Patient On (Oxygen Delivery Method): room air    PHYSICAL EXAM:  GENERAL: NAD, well-groomed, well-developed  HEAD:  Atraumatic, Normocephalic  EYES: EOMI, PERRLA, conjunctiva and sclera clear  ENMT: Moist mucous membranes, no lesions  NECK: Supple.  CHEST/LUNG: Clear to auscultation bilaterally; No rales, rhonchi, wheezing, or rubs  HEART: S1, S2.   ABDOMEN: Epigastric and periumbilical discomfort + BS +  EXTREMITIES:  2+ Peripheral Pulses, No clubbing, cyanosis, or edema  MS: No joint swelling or deformity.   LYMPH: No lymphadenopathy noted  SKIN: No rashes or lesions  NERVOUS SYSTEM:  No focal deficit.   PSYCH:  Awake and alert.   LABS:                         11.4   9.81  )-----------( 171      ( 27 Apr 2023 06:00 )             34.2     27 Apr 2023 06:00    137    |  102    |  3      ----------------------------<  118    3.5     |  25     |  0.63     Ca    8.2        27 Apr 2023 06:00  Phos  3.9       27 Apr 2023 06:00  Mg     1.7       27 Apr 2023 06:00    PT/INR - ( 26 Apr 2023 06:00 )   PT: 12.1 sec;   INR: 1.05 ratio      PTT - ( 26 Apr 2023 06:00 )  PTT:31.1 sec    04-25 Chol 213<H> LDL -- HDL 50<L> Trig 212<H>    Thyroid Stimulating Hormone, Serum: 1.81 uIU/mL (04-25 @ 05:48)    Culture Results:   <10,000 CFU/mL Normal Urogenital Kalyn (04-24 @ 19:40)    RADIOLOGY TEST: (IMAGES REVIEWED BY ME)    Imaging Personally Reviewed:  [X] YES      HEALTH ISSUES - PROBLEM Dx:  Abdominal pain    Small bowel obstruction    Preoperative clearance    Hypothyroidism    Prophylactic measure

## 2023-04-27 NOTE — DIETITIAN INITIAL EVALUATION ADULT - PERTINENT LABORATORY DATA
04-27    137  |  102  |  3<L>  ----------------------------<  118<H>  3.5   |  25  |  0.63    Ca    8.2<L>      27 Apr 2023 06:00  Phos  3.9     04-27  Mg     1.7     04-27    A1C with Estimated Average Glucose Result: 5.4 % (04-25-23 @ 05:48)

## 2023-04-27 NOTE — DIETITIAN INITIAL EVALUATION ADULT - NUTRITIONGOAL OUTCOME1
pt to tolerate diet initiation to clear liquids with progressed to soft solids as medically feasible; deter wt loss

## 2023-04-27 NOTE — DIETITIAN INITIAL EVALUATION ADULT - OTHER INFO
Per H&P, pt is a "37 years old female with past medical history of hypothyroidism, h/o lap tin, who came in to ER last night with 1 day history of nausea, vomiting and epigastric pain. Patient states she had pain occasionally in past but never with vomiting. As she had multiple episode of vomiting, she came in to ER. In ER, patient is noted to have partial small bowel obstruction. She is admitted for further care."    Pt seen for nutrition assessment secondary to NPO status x </4 days.  Pt admitted with abd pain, N/V x 1 day - found to have SBO secondary to Meckel's diverticulum, now s/p lap SBR and anastomosis 4/26 (POD #1).  Observed patient walking garza, visited in room with family at bedside.  Pt on LR@125ml/hr for hydration, pt able to verbalize understanding of NPO.  Reinforced usual diet initiation and progression to  Per H&P, pt is a "37 years old female with past medical history of hypothyroidism, h/o lap tin, who came in to ER last night with 1 day history of nausea, vomiting and epigastric pain. Patient states she had pain occasionally in past but never with vomiting. As she had multiple episode of vomiting, she came in to ER. In ER, patient is noted to have partial small bowel obstruction. She is admitted for further care."    Pt seen for nutrition assessment secondary to NPO status x </4 days.  Pt admitted with abd pain, N/V x 1 day - found to have SBO secondary to Meckel's diverticulum, now s/p lap SBR and anastomosis 4/26 (POD #1).  Observed patient walking garza, visited in room with family at bedside.  Pt on LR@125ml/hr for hydration, pt able to verbalize understanding of NPO.  Reinforced usual diet initiation and progression from clear liquids to regular/soft solids as medically feasible/per surgical team discretion (diet order currently active for NPO with chewing gum/hard candy/ice chips and sips of water).  Pt denies nutrition related questions or concerns at time of visit.  Reports UBW ~140#s, stable PTA.  RD to follow up and will continue to monitor pt's nutrition status.

## 2023-04-27 NOTE — PROGRESS NOTE ADULT - REASON FOR ADMISSION
Abdominal pain and nausea or vomiting. Abdominal pain and nausea/ vomiting -> Meckles Diverticulum -> Laparoscopic SBR and anastomosis Abdominal pain and nausea/ vomiting -> Meckel's Diverticulum -> Laparoscopic SBR and anastomosis

## 2023-04-28 ENCOUNTER — TRANSCRIPTION ENCOUNTER (OUTPATIENT)
Age: 38
End: 2023-04-28

## 2023-04-28 DIAGNOSIS — D62 ACUTE POSTHEMORRHAGIC ANEMIA: ICD-10-CM

## 2023-04-28 LAB
ALBUMIN SERPL ELPH-MCNC: 2.7 G/DL — LOW (ref 3.3–5)
ALP SERPL-CCNC: 66 U/L — SIGNIFICANT CHANGE UP (ref 30–120)
ALT FLD-CCNC: 28 U/L DA — SIGNIFICANT CHANGE UP (ref 10–60)
ANION GAP SERPL CALC-SCNC: 10 MMOL/L — SIGNIFICANT CHANGE UP (ref 5–17)
AST SERPL-CCNC: 17 U/L — SIGNIFICANT CHANGE UP (ref 10–40)
BILIRUB SERPL-MCNC: 0.8 MG/DL — SIGNIFICANT CHANGE UP (ref 0.2–1.2)
BUN SERPL-MCNC: 4 MG/DL — LOW (ref 7–23)
CALCIUM SERPL-MCNC: 8.1 MG/DL — LOW (ref 8.4–10.5)
CHLORIDE SERPL-SCNC: 102 MMOL/L — SIGNIFICANT CHANGE UP (ref 96–108)
CO2 SERPL-SCNC: 25 MMOL/L — SIGNIFICANT CHANGE UP (ref 22–31)
CREAT SERPL-MCNC: 0.59 MG/DL — SIGNIFICANT CHANGE UP (ref 0.5–1.3)
EGFR: 118 ML/MIN/1.73M2 — SIGNIFICANT CHANGE UP
GLUCOSE SERPL-MCNC: 85 MG/DL — SIGNIFICANT CHANGE UP (ref 70–99)
HCT VFR BLD CALC: 31.2 % — LOW (ref 34.5–45)
HGB BLD-MCNC: 10.5 G/DL — LOW (ref 11.5–15.5)
MCHC RBC-ENTMCNC: 29 PG — SIGNIFICANT CHANGE UP (ref 27–34)
MCHC RBC-ENTMCNC: 33.7 GM/DL — SIGNIFICANT CHANGE UP (ref 32–36)
MCV RBC AUTO: 86.2 FL — SIGNIFICANT CHANGE UP (ref 80–100)
NRBC # BLD: 0 /100 WBCS — SIGNIFICANT CHANGE UP (ref 0–0)
PLATELET # BLD AUTO: 144 K/UL — LOW (ref 150–400)
POTASSIUM SERPL-MCNC: 3 MMOL/L — LOW (ref 3.5–5.3)
POTASSIUM SERPL-SCNC: 3 MMOL/L — LOW (ref 3.5–5.3)
PROT SERPL-MCNC: 6.4 G/DL — SIGNIFICANT CHANGE UP (ref 6–8.3)
RBC # BLD: 3.62 M/UL — LOW (ref 3.8–5.2)
RBC # FLD: 13.5 % — SIGNIFICANT CHANGE UP (ref 10.3–14.5)
SODIUM SERPL-SCNC: 137 MMOL/L — SIGNIFICANT CHANGE UP (ref 135–145)
WBC # BLD: 8.59 K/UL — SIGNIFICANT CHANGE UP (ref 3.8–10.5)
WBC # FLD AUTO: 8.59 K/UL — SIGNIFICANT CHANGE UP (ref 3.8–10.5)

## 2023-04-28 RX ORDER — LEVOTHYROXINE SODIUM 125 MCG
75 TABLET ORAL AT BEDTIME
Refills: 0 | Status: DISCONTINUED | OUTPATIENT
Start: 2023-04-28 | End: 2023-04-29

## 2023-04-28 RX ORDER — POTASSIUM CHLORIDE 20 MEQ
10 PACKET (EA) ORAL
Refills: 0 | Status: COMPLETED | OUTPATIENT
Start: 2023-04-28 | End: 2023-04-28

## 2023-04-28 RX ORDER — POTASSIUM CHLORIDE 20 MEQ
40 PACKET (EA) ORAL ONCE
Refills: 0 | Status: COMPLETED | OUTPATIENT
Start: 2023-04-28 | End: 2023-04-28

## 2023-04-28 RX ADMIN — PIPERACILLIN AND TAZOBACTAM 25 GRAM(S): 4; .5 INJECTION, POWDER, LYOPHILIZED, FOR SOLUTION INTRAVENOUS at 02:09

## 2023-04-28 RX ADMIN — Medication 400 MILLIGRAM(S): at 15:00

## 2023-04-28 RX ADMIN — PANTOPRAZOLE SODIUM 40 MILLIGRAM(S): 20 TABLET, DELAYED RELEASE ORAL at 21:24

## 2023-04-28 RX ADMIN — Medication 800 MILLIGRAM(S): at 15:06

## 2023-04-28 RX ADMIN — Medication 400 MILLIGRAM(S): at 06:35

## 2023-04-28 RX ADMIN — Medication 100 MILLIEQUIVALENT(S): at 08:24

## 2023-04-28 RX ADMIN — Medication 800 MILLIGRAM(S): at 07:04

## 2023-04-28 RX ADMIN — ENOXAPARIN SODIUM 40 MILLIGRAM(S): 100 INJECTION SUBCUTANEOUS at 12:29

## 2023-04-28 RX ADMIN — Medication 400 MILLIGRAM(S): at 23:39

## 2023-04-28 RX ADMIN — Medication 40 MILLIEQUIVALENT(S): at 12:29

## 2023-04-28 RX ADMIN — SODIUM CHLORIDE 75 MILLILITER(S): 9 INJECTION, SOLUTION INTRAVENOUS at 08:47

## 2023-04-28 RX ADMIN — Medication 100 MILLIEQUIVALENT(S): at 10:59

## 2023-04-28 RX ADMIN — Medication 100 MILLIEQUIVALENT(S): at 09:45

## 2023-04-28 NOTE — DISCHARGE NOTE PROVIDER - NSDCCPTREATMENT_GEN_ALL_CORE_FT
PRINCIPAL PROCEDURE  Procedure: Small bowel resection with anastomosis  Findings and Treatment: laparoscopic

## 2023-04-28 NOTE — PROGRESS NOTE ADULT - NS ATTEND AMEND GEN_ALL_CORE FT
I have personally seen and examined the patient.  I fully participated in the care of this patient.  I have made amendments to the documentation where necessary, and agree with the history, physical exam, impression/assessment, and plan as documented by the PA    POD 2 s/p laparoscopic SBR for Meckel's diverticulum and associated inflammatory stricture causing SBO  Patient now with return of bowel function  Passing flatus  Had large BM   Feels much better  Abd soft, less distended, appropriate miguel incisional tenderness    -advance to full liquid diet for dinner  -OOB/ambulate  -IS  -ok to advance diet as tolerated, d/c when stable per medicine possibly tomorrow - f/u with me in 1-2 weeks  -d/w Dr. Villanueva

## 2023-04-28 NOTE — DISCHARGE NOTE PROVIDER - CARE PROVIDER_API CALL
Mili Manuel)  Surgery; Surgical Critical Care  [address] [place] [address]  Phone: [phone]  Fax: [phone]  Follow Up Time: 1 week   Mili Manuel)  Surgery; Surgical Critical Care  [address] [place] [address]  Phone: [phone]  Fax: [phone]  Follow Up Time: 1 week    Trevor Harden)  Internal Medicine  [address] [place] [address]  Phone: [phone]  Fax: [phone]  Established Patient  Follow Up Time: 1 week

## 2023-04-28 NOTE — CASE MANAGEMENT PROGRESS NOTE - NSCMPROGRESSNOTE_GEN_ALL_CORE
S/P lap SBR and anastomosis, POD # 2. As per medical rounds patient is still medically acute, advance to clears and tolerating. CM will cont to be available.

## 2023-04-28 NOTE — PROGRESS NOTE ADULT - ASSESSMENT
A/P: POD#2, progressing well,    Continue current care  Diet - advance to clear liquids  K repletion - IV and oral ordered  GI/DVT prophylaxis  Analgesia prn  OOB/ambulation on the floor  Incentive spirometry/Cough/Deep breathing exercises  AM labs    Case & plan discussed with Dr. UYEN Manuel and patient's nurse.   A/P: POD#2, progressing well,    Continue current care  Diet - advance to clear liquids  K repletion - IV and oral ordered  discontinue abx/Zosyn  GI/DVT prophylaxis  Analgesia prn  OOB/ambulation on the floor  Incentive spirometry/Cough/Deep breathing exercises  AM labs    Case & plan discussed with Dr. UYEN Manuel and patient's nurse.   A/P: POD#2, progressing well,    Continue current care  Diet - advance to clear liquids  Reduce IVF to LR 75ml/h once tolerating clears  K repletion - IV and oral ordered  discontinue abx/Zosyn  GI/DVT prophylaxis  Analgesia prn  OOB/ambulation on the floor  Incentive spirometry/Cough/Deep breathing exercises  AM labs    Case & plan discussed with Dr. UYEN Manuel and patient's nurse.

## 2023-04-28 NOTE — PROGRESS NOTE ADULT - SUBJECTIVE AND OBJECTIVE BOX
SURGERY PA NOTE - POD#2    38 y/o HF with PMHx of hypothyroidism admitted with SBO, s/p laparoscopic SB resection for Meckel's diverticulum on 4/26/2023    SUBJECTIVE:  Patient seen at beside, no overnight events, no complaints at this time.  Reports pain is well-controlled (4/10) and improved ("much better than yesterday since I have been passing gas").  Patient admits to tolerating ice chips, flatus, no bowel movement, voiding independently ("clear urine), ambulating.  Patient denies chest pain, shortness of breath, headache, dizziness, fever/chills, dysuria, nausea, vomiting, diarrhea.    OBJECTIVE:   T(F): 99.4 (04-28-23 @ 07:47), Max: 99.4 (04-28-23 @ 07:47)  HR: 92 (04-28-23 @ 07:47) (75 - 92)  BP: 98/62 (04-28-23 @ 07:47) (93/59 - 103/66)  RR: 18 (04-28-23 @ 07:47) (18 - 19)  SpO2: 95% (04-28-23 @ 07:47) (95% - 96%)      PHYSICAL EXAM:  General: A+O x 3, NAD  HEENT: PERRLA, EOMs intact, non-icteric  Chest: Clear to auscultation bilaterally, no rales/rhonchi/wheezes noted  Heart: S1, S2 clear, RRR  Abdomen: soft, non-distended, +BS, mild incisional tenderness, no guarding, no rebound, incisions clean - Dermabond intact  Extremities: nonedematous bilaterally, warm, no calf tenderness noted     MEDICATIONS  (STANDING):  enoxaparin Injectable 40 milliGRAM(s) SubCutaneous every 24 hours  lactated ringers. 1000 milliLiter(s) (125 mL/Hr) IV Continuous <Continuous>  levothyroxine Injectable 37.5 MICROGram(s) IV Push at bedtime  pantoprazole  Injectable 40 milliGRAM(s) IV Push every 24 hours  piperacillin/tazobactam IVPB.. 3.375 Gram(s) IV Intermittent every 8 hours  potassium chloride  10 mEq/100 mL IVPB 10 milliEquivalent(s) IV Intermittent every 1 hour    MEDICATIONS  (PRN):  HYDROmorphone  Injectable 0.5 milliGRAM(s) IV Push every 4 hours PRN Severe Pain (7 - 10)  ibuprofen IVPB .. 800 milliGRAM(s) IV Intermittent every 6 hours PRN Moderate Pain (4 - 6)  ondansetron Injectable 4 milliGRAM(s) IV Push every 8 hours PRN Nausea and/or Vomiting      LABS:                        10.5   8.59  )-----------( 144      ( 28 Apr 2023 06:38 )             31.2     04-28    137  |  102  |  4<L>  ----------------------------<  85  3.0<L>   |  25  |  0.59    Ca    8.1<L>      28 Apr 2023 06:38  Phos  3.9     04-27  Mg     1.7     04-27    TPro  6.4  /  Alb  2.7<L>  /  TBili  0.8  /  DBili  x   /  AST  17  /  ALT  28  /  AlkPhos  66  04-28

## 2023-04-28 NOTE — DISCHARGE NOTE PROVIDER - PROVIDER TOKENS
PROVIDER:[TOKEN:[[id number]],FOLLOWUP:[1 week]] PROVIDER:[TOKEN:[[id number]],FOLLOWUP:[1 week]],PROVIDER:[TOKEN:[[id number]],FOLLOWUP:[1 week],ESTABLISHEDPATIENT:[T]]

## 2023-04-28 NOTE — DISCHARGE NOTE PROVIDER - HOSPITAL COURSE
36 y/o HF with PMHx of hypothyroidism admitted to [facility] on  4/24/2023 with abdominal pain, CT findings of mechanical small bowel obstruction and underwent laparoscopic small bowel resection on 4/26/2023. Patient tolerated procedure well and progressed appropriately. Currently tolerating regular diet, voiding independently, having bowel movements and ambulating. Discharged on                with home medications, incentive spirometer and instructions to follow-up with SURGEON/Dr. UYEN Manuel in 1-2 weeks. 38 y/o HF with PMHx of hypothyroidism admitted to [facility] on  4/24/2023 with abdominal pain, CT findings of mechanical small bowel obstruction and underwent laparoscopic small bowel resection on 4/26/2023. Patient tolerated procedure well and progressed appropriately. Currently tolerating regular diet, voiding independently, having bowel movements and ambulating. Discharged on  04/29/2023              with home medications, incentive spirometer and instructions to follow-up with SURGEON/Dr. UYEN Manuel in 1-2 weeks.

## 2023-04-28 NOTE — PROGRESS NOTE ADULT - SUBJECTIVE AND OBJECTIVE BOX
Patient is a 37y old  Female who presents with a chief complaint of Abdominal pain and nausea or vomiting. (26 Apr 2023 08:56)    HPI: 37 years old female with past medical history of hypothyroidism, h/o lap tin, who came in to ER last night with 1 day history of nausea, vomiting and epigastric pain. Patient states she had pain occasionally in past but never with vomiting. As she had multiple episode of vomiting, she came in to ER. In ER, patient is noted to have partial small bowel obstruction. She is admitted for further care.     INTERVAL HPI/OVERNIGHT EVENTS:  Chart reviewed, notes reviewed.   Patient seen and examined.  Being followed by following specialists: Surgery     Consultant(s) Notes Reviewed:  [X] Yes    Care Discussed with Consultants/Other Providers: [X] Yes    04/26/2023 --> Events noted. Patient had low blood pressure but was otherwise asymptomatic. She received fluid bolus with improvement in her blood pressure. Patient is in preop area for surgery today.    04/27/2023 --> Patient is s/p Laparoscopic SBR and anastomosis yesterday. Doing well. Pain is controlled. No Flatus or BM yet. Mild nausea, no vomiting. No fever or chills.      04/28/2023 --> Doing better. Passed Flatus. No BM yet. No fever or chills. Pain is controlled. Started on clear liquids.     REVIEW OF SYSTEMS:  CONSTITUTIONAL: No fever, weight loss, or fatigue  EYES: No eye pain, or discharge  ENMT: No sinus or throat pain  NECK: No pain or stiffness  BREASTS: No pain, masses, or nipple discharge  RESPIRATORY: No cough, wheezing, chills or hemoptysis; No shortness of breath  CARDIOVASCULAR: No chest pain, palpitations, dizziness, or leg swelling  GASTROINTESTINAL: + abdominal pain. (improved)  GENITOURINARY: No dysuria, frequency, hematuria, or incontinence  NEUROLOGICAL: No loss of strength, numbness, or tremors  SKIN: No itching, burning, rashes, or lesions   LYMPH NODES: No enlarged glands  ENDOCRINE: No polydipsia or polyuria  MUSCULOSKELETAL: No muscle, back, or extremity pain  PSYCHIATRIC: No depression, anxiety, mood swings.  HEME/LYMPH: No easy bruising, or bleeding gums  ALLERGY AND IMMUNOLOGIC: No hives or eczema    Allergies: No Known Allergies    Intolerances      Home Medications:  levothyroxine 75 mcg (0.075 mg) oral tablet: 1 tab(s) orally once a day (26 Apr 2023 11:33)    MEDICATIONS  (STANDING):  enoxaparin Injectable 40 milliGRAM(s) SubCutaneous every 24 hours  lactated ringers. 1000 milliLiter(s) (75 mL/Hr) IV Continuous <Continuous>  levothyroxine 75 MICROGram(s) Oral at bedtime  pantoprazole  Injectable 40 milliGRAM(s) IV Push every 24 hours    MEDICATIONS  (PRN):  HYDROmorphone  Injectable 0.5 milliGRAM(s) IV Push every 4 hours PRN Severe Pain (7 - 10)  ibuprofen IVPB .. 800 milliGRAM(s) IV Intermittent every 6 hours PRN Moderate Pain (4 - 6)  ondansetron Injectable 4 milliGRAM(s) IV Push every 8 hours PRN Nausea and/or Vomiting    Vital Signs Last 24 Hrs  T(C): 37.4 (28 Apr 2023 07:47), Max: 37.4 (28 Apr 2023 07:47)  T(F): 99.4 (28 Apr 2023 07:47), Max: 99.4 (28 Apr 2023 07:47)  HR: 92 (28 Apr 2023 07:47) (75 - 92)  BP: 98/62 (28 Apr 2023 07:47) (93/59 - 103/66)  BP(mean): --  RR: 18 (28 Apr 2023 07:47) (18 - 19)  SpO2: 95% (28 Apr 2023 07:47) (95% - 96%)    Parameters below as of 28 Apr 2023 07:47  Patient On (Oxygen Delivery Method): room air    PHYSICAL EXAM:  GENERAL: NAD, well-groomed, well-developed  HEAD:  Atraumatic, Normocephalic  EYES: EOMI, PERRLA, conjunctiva and sclera clear  ENMT: Moist mucous membranes, no lesions  NECK: Supple.  CHEST/LUNG: Clear to auscultation bilaterally; No rales, rhonchi, wheezing, or rubs  HEART: S1, S2.   ABDOMEN: Epigastric and periumbilical discomfort + BS +  EXTREMITIES:  2+ Peripheral Pulses, No clubbing, cyanosis, or edema  MS: No joint swelling or deformity.   LYMPH: No lymphadenopathy noted  SKIN: No rashes or lesions  NERVOUS SYSTEM:  No focal deficit.   PSYCH:  Awake and alert.   LABS:                         10.5   8.59  )-----------( 144      ( 28 Apr 2023 06:38 )             31.2     28 Apr 2023 06:38    137    |  102    |  4      ----------------------------<  85     3.0     |  25     |  0.59     Ca    8.1        28 Apr 2023 06:38  Phos  3.9       27 Apr 2023 06:00  Mg     1.7       27 Apr 2023 06:00    TPro  6.4    /  Alb  2.7    /  TBili  0.8    /  DBili  x      /  AST  17     /  ALT  28     /  AlkPhos  66     28 Apr 2023 06:38    04-25 Chol 213<H> LDL -- HDL 50<L> Trig 212<H>    Thyroid Stimulating Hormone, Serum: 1.81 uIU/mL (04-25 @ 05:48)    Culture Results:   <10,000 CFU/mL Normal Urogenital Kalyn (04-24 @ 19:40)    RADIOLOGY TEST: (IMAGES REVIEWED BY ME)    Imaging Personally Reviewed:  [X] YES      HEALTH ISSUES - PROBLEM Dx:  Abdominal pain    Small bowel obstruction    Preoperative clearance    Hypothyroidism    Prophylactic measure

## 2023-04-28 NOTE — DISCHARGE NOTE PROVIDER - NSDCMRMEDTOKEN_GEN_ALL_CORE_FT
levothyroxine 75 mcg (0.075 mg) oral tablet: 1 tab(s) orally once a day   levothyroxine 75 mcg (0.075 mg) oral tablet: 1 tab(s) orally once a day  Tylenol 325 mg oral tablet: 2 tab(s) orally every 6 hours as needed for  moderate pain

## 2023-04-28 NOTE — DISCHARGE NOTE PROVIDER - NSDCFUADDINST_GEN_ALL_CORE_FT
REST! Apply ice packs to incision sites 20 mins on, 20 mins off every 4 hours for the first 24 hours.    If taking narcotic pain medications, may take COLACE (OTC stool softener) as directed to prevent constipation.    May take over-the-counter acetaminophen/Tylenol and/or ibuprofen/Motrin as directed for mild to moderate pain.  Increase ambulation and utilize incentive spirometer as directed.

## 2023-04-28 NOTE — DISCHARGE NOTE PROVIDER - NSDCCPCAREPLAN_GEN_ALL_CORE_FT
PRINCIPAL DISCHARGE DIAGNOSIS  Diagnosis: Small bowel obstruction  Assessment and Plan of Treatment:      PRINCIPAL DISCHARGE DIAGNOSIS  Diagnosis: Small bowel obstruction  Assessment and Plan of Treatment: increase activity as tolerated.  No heavy lifting, pulling or pushing until cleared by surgery.  Low fiber soft diet.  Surgical site care as per surgery.  do deep breathing exercises/incentive spirometry  Follow-up with Dr. Mili Manuel in 1-2 weeks.  Follow-up with Dr. Fina Murray in 1-2 weeks.  If you have worsening abdominal pain, nausea/vomiting or persistent high fever, come back to emergency room for evaluation.      SECONDARY DISCHARGE DIAGNOSES  Diagnosis: Hypothyroidism  Assessment and Plan of Treatment:     Diagnosis: Anemia due to acute blood loss  Assessment and Plan of Treatment:

## 2023-04-29 ENCOUNTER — TRANSCRIPTION ENCOUNTER (OUTPATIENT)
Age: 38
End: 2023-04-29

## 2023-04-29 VITALS
HEART RATE: 83 BPM | TEMPERATURE: 98 F | SYSTOLIC BLOOD PRESSURE: 93 MMHG | OXYGEN SATURATION: 98 % | RESPIRATION RATE: 16 BRPM | DIASTOLIC BLOOD PRESSURE: 60 MMHG

## 2023-04-29 LAB
ANION GAP SERPL CALC-SCNC: 13 MMOL/L — SIGNIFICANT CHANGE UP (ref 5–17)
BUN SERPL-MCNC: 5 MG/DL — LOW (ref 7–23)
CALCIUM SERPL-MCNC: 8.6 MG/DL — SIGNIFICANT CHANGE UP (ref 8.4–10.5)
CHLORIDE SERPL-SCNC: 103 MMOL/L — SIGNIFICANT CHANGE UP (ref 96–108)
CO2 SERPL-SCNC: 24 MMOL/L — SIGNIFICANT CHANGE UP (ref 22–31)
CREAT SERPL-MCNC: 0.46 MG/DL — LOW (ref 0.5–1.3)
EGFR: 126 ML/MIN/1.73M2 — SIGNIFICANT CHANGE UP
GLUCOSE SERPL-MCNC: 84 MG/DL — SIGNIFICANT CHANGE UP (ref 70–99)
HCT VFR BLD CALC: 32.1 % — LOW (ref 34.5–45)
HGB BLD-MCNC: 10.5 G/DL — LOW (ref 11.5–15.5)
MCHC RBC-ENTMCNC: 28.8 PG — SIGNIFICANT CHANGE UP (ref 27–34)
MCHC RBC-ENTMCNC: 32.7 GM/DL — SIGNIFICANT CHANGE UP (ref 32–36)
MCV RBC AUTO: 87.9 FL — SIGNIFICANT CHANGE UP (ref 80–100)
NRBC # BLD: 0 /100 WBCS — SIGNIFICANT CHANGE UP (ref 0–0)
PLATELET # BLD AUTO: 166 K/UL — SIGNIFICANT CHANGE UP (ref 150–400)
POTASSIUM SERPL-MCNC: 3.9 MMOL/L — SIGNIFICANT CHANGE UP (ref 3.5–5.3)
POTASSIUM SERPL-SCNC: 3.9 MMOL/L — SIGNIFICANT CHANGE UP (ref 3.5–5.3)
RBC # BLD: 3.65 M/UL — LOW (ref 3.8–5.2)
RBC # FLD: 13.6 % — SIGNIFICANT CHANGE UP (ref 10.3–14.5)
SODIUM SERPL-SCNC: 140 MMOL/L — SIGNIFICANT CHANGE UP (ref 135–145)
WBC # BLD: 6.77 K/UL — SIGNIFICANT CHANGE UP (ref 3.8–10.5)
WBC # FLD AUTO: 6.77 K/UL — SIGNIFICANT CHANGE UP (ref 3.8–10.5)

## 2023-04-29 PROCEDURE — 86901 BLOOD TYPING SEROLOGIC RH(D): CPT

## 2023-04-29 PROCEDURE — 80048 BASIC METABOLIC PNL TOTAL CA: CPT

## 2023-04-29 PROCEDURE — 74018 RADEX ABDOMEN 1 VIEW: CPT

## 2023-04-29 PROCEDURE — 99285 EMERGENCY DEPT VISIT HI MDM: CPT | Mod: 25

## 2023-04-29 PROCEDURE — 85610 PROTHROMBIN TIME: CPT

## 2023-04-29 PROCEDURE — 83735 ASSAY OF MAGNESIUM: CPT

## 2023-04-29 PROCEDURE — 85730 THROMBOPLASTIN TIME PARTIAL: CPT

## 2023-04-29 PROCEDURE — 83690 ASSAY OF LIPASE: CPT

## 2023-04-29 PROCEDURE — 86850 RBC ANTIBODY SCREEN: CPT

## 2023-04-29 PROCEDURE — 82962 GLUCOSE BLOOD TEST: CPT

## 2023-04-29 PROCEDURE — 86900 BLOOD TYPING SEROLOGIC ABO: CPT

## 2023-04-29 PROCEDURE — 87086 URINE CULTURE/COLONY COUNT: CPT

## 2023-04-29 PROCEDURE — 88307 TISSUE EXAM BY PATHOLOGIST: CPT

## 2023-04-29 PROCEDURE — 81001 URINALYSIS AUTO W/SCOPE: CPT

## 2023-04-29 PROCEDURE — 84443 ASSAY THYROID STIM HORMONE: CPT

## 2023-04-29 PROCEDURE — 85025 COMPLETE CBC W/AUTO DIFF WBC: CPT

## 2023-04-29 PROCEDURE — 84100 ASSAY OF PHOSPHORUS: CPT

## 2023-04-29 PROCEDURE — 80061 LIPID PANEL: CPT

## 2023-04-29 PROCEDURE — 71045 X-RAY EXAM CHEST 1 VIEW: CPT

## 2023-04-29 PROCEDURE — 83036 HEMOGLOBIN GLYCOSYLATED A1C: CPT

## 2023-04-29 PROCEDURE — 81025 URINE PREGNANCY TEST: CPT

## 2023-04-29 PROCEDURE — 74177 CT ABD & PELVIS W/CONTRAST: CPT | Mod: MA

## 2023-04-29 PROCEDURE — 93005 ELECTROCARDIOGRAM TRACING: CPT

## 2023-04-29 PROCEDURE — 36415 COLL VENOUS BLD VENIPUNCTURE: CPT

## 2023-04-29 PROCEDURE — 96374 THER/PROPH/DIAG INJ IV PUSH: CPT | Mod: XU

## 2023-04-29 PROCEDURE — 96375 TX/PRO/DX INJ NEW DRUG ADDON: CPT

## 2023-04-29 PROCEDURE — C1889: CPT

## 2023-04-29 PROCEDURE — 85027 COMPLETE CBC AUTOMATED: CPT

## 2023-04-29 PROCEDURE — 80053 COMPREHEN METABOLIC PANEL: CPT

## 2023-04-29 RX ORDER — ACETAMINOPHEN 500 MG
2 TABLET ORAL
Qty: 0 | Refills: 0 | DISCHARGE

## 2023-04-29 RX ADMIN — Medication 400 MILLIGRAM(S): at 10:36

## 2023-04-29 RX ADMIN — Medication 800 MILLIGRAM(S): at 11:00

## 2023-04-29 RX ADMIN — Medication 75 MICROGRAM(S): at 05:19

## 2023-04-29 RX ADMIN — Medication 800 MILLIGRAM(S): at 00:15

## 2023-04-29 NOTE — PROGRESS NOTE ADULT - NSPROGADDITIONALINFOA_GEN_ALL_CORE
Discussed with patient.
Discussed with patient in detail.     Discharge planning once cleared by surgery.
Discussed with patient in detail.     Discharge home once tolerates lunch and if OK with surgery.
Discussed with patient.

## 2023-04-29 NOTE — PROGRESS NOTE ADULT - PROBLEM SELECTOR PROBLEM 3
Anemia due to acute blood loss
Small bowel obstruction
Anemia due to acute blood loss
Small bowel obstruction

## 2023-04-29 NOTE — PROGRESS NOTE ADULT - SUBJECTIVE AND OBJECTIVE BOX
Patient is a 37y old  Female who presents with a chief complaint of Abdominal pain and nausea or vomiting. (26 Apr 2023 08:56)    HPI: 37 years old female with past medical history of hypothyroidism, h/o lap tin, who came in to ER last night with 1 day history of nausea, vomiting and epigastric pain. Patient states she had pain occasionally in past but never with vomiting. As she had multiple episode of vomiting, she came in to ER. In ER, patient is noted to have partial small bowel obstruction. She is admitted for further care.     INTERVAL HPI/OVERNIGHT EVENTS:  Chart reviewed, notes reviewed.   Patient seen and examined.  Being followed by following specialists: Surgery     Consultant(s) Notes Reviewed:  [X] Yes    Care Discussed with Consultants/Other Providers: [X] Yes    04/26/2023 --> Events noted. Patient had low blood pressure but was otherwise asymptomatic. She received fluid bolus with improvement in her blood pressure. Patient is in preop area for surgery today.    04/27/2023 --> Patient is s/p Laparoscopic SBR and anastomosis yesterday. Doing well. Pain is controlled. No Flatus or BM yet. Mild nausea, no vomiting. No fever or chills.    04/28/2023 --> Doing better. Passed Flatus. No BM yet. No fever or chills. Pain is controlled. Started on clear liquids.     04/29/2023 --> Doing well. No new issues. Tolerating full liquid diet. Moved her bowels 3 times. No nausea or vomiting. No fever or chills.     REVIEW OF SYSTEMS:  CONSTITUTIONAL: No fever, weight loss, or fatigue  EYES: No eye pain, or discharge  ENMT: No sinus or throat pain  NECK: No pain or stiffness  BREASTS: No pain, masses, or nipple discharge  RESPIRATORY: No cough, wheezing, chills or hemoptysis; No shortness of breath  CARDIOVASCULAR: No chest pain, palpitations, dizziness, or leg swelling  GASTROINTESTINAL: + abdominal pain. (improved)  GENITOURINARY: No dysuria, frequency, hematuria, or incontinence  NEUROLOGICAL: No loss of strength, numbness, or tremors  SKIN: No itching, burning, rashes, or lesions   LYMPH NODES: No enlarged glands  ENDOCRINE: No polydipsia or polyuria  MUSCULOSKELETAL: No muscle, back, or extremity pain  PSYCHIATRIC: No depression, anxiety, mood swings.  HEME/LYMPH: No easy bruising, or bleeding gums  ALLERGY AND IMMUNOLOGIC: No hives or eczema    Allergies: No Known Allergies    Intolerances      Home Medications:  levothyroxine 75 mcg (0.075 mg) oral tablet: 1 tab(s) orally once a day (26 Apr 2023 11:33)    MEDICATIONS  (STANDING):  enoxaparin Injectable 40 milliGRAM(s) SubCutaneous every 24 hours  lactated ringers. 1000 milliLiter(s) (75 mL/Hr) IV Continuous <Continuous>  levothyroxine 75 MICROGram(s) Oral at bedtime  pantoprazole  Injectable 40 milliGRAM(s) IV Push every 24 hours    MEDICATIONS  (PRN):  HYDROmorphone  Injectable 0.5 milliGRAM(s) IV Push every 4 hours PRN Severe Pain (7 - 10)  ibuprofen IVPB .. 800 milliGRAM(s) IV Intermittent every 6 hours PRN Moderate Pain (4 - 6)  ondansetron Injectable 4 milliGRAM(s) IV Push every 8 hours PRN Nausea and/or Vomiting    Vital Signs Last 24 Hrs  T(C): 36.8 (29 Apr 2023 07:49), Max: 36.8 (28 Apr 2023 15:21)  T(F): 98.3 (29 Apr 2023 07:49), Max: 98.3 (28 Apr 2023 15:21)  HR: 70 (29 Apr 2023 07:49) (70 - 82)  BP: 98/61 (29 Apr 2023 07:49) (98/61 - 100/65)  BP(mean): --  RR: 16 (29 Apr 2023 07:49) (16 - 17)  SpO2: 96% (29 Apr 2023 07:49) (96% - 97%)    Parameters below as of 29 Apr 2023 07:49  Patient On (Oxygen Delivery Method): room air    PHYSICAL EXAM:  GENERAL: NAD, well-groomed, well-developed  HEAD:  Atraumatic, Normocephalic  EYES: EOMI, PERRLA, conjunctiva and sclera clear  ENMT: Moist mucous membranes, no lesions  NECK: Supple.  CHEST/LUNG: Clear to auscultation bilaterally; No rales, rhonchi, wheezing, or rubs  HEART: S1, S2.   ABDOMEN: Epigastric and periumbilical discomfort + BS +  EXTREMITIES:  2+ Peripheral Pulses, No clubbing, cyanosis, or edema  MS: No joint swelling or deformity.   LYMPH: No lymphadenopathy noted  SKIN: No rashes or lesions  NERVOUS SYSTEM:  No focal deficit.   PSYCH:  Awake and alert.   LABS:                          10.5   6.77  )-----------( 166      ( 29 Apr 2023 06:12 )             32.1     29 Apr 2023 06:12    140    |  103    |  5      ----------------------------<  84     3.9     |  24     |  0.46     Ca    8.6        29 Apr 2023 06:12    TPro  6.4    /  Alb  2.7    /  TBili  0.8    /  DBili  x      /  AST  17     /  ALT  28     /  AlkPhos  66     28 Apr 2023 06:38    04-25 Chol 213<H> LDL -- HDL 50<L> Trig 212<H>    Thyroid Stimulating Hormone, Serum: 1.81 uIU/mL (04-25 @ 05:48)      RADIOLOGY TEST: (IMAGES REVIEWED BY ME)    Imaging Personally Reviewed:  [X] YES      HEALTH ISSUES - PROBLEM Dx:  Abdominal pain    Small bowel obstruction    Preoperative clearance    Hypothyroidism    Prophylactic measure

## 2023-04-29 NOTE — PROGRESS NOTE ADULT - PROBLEM SELECTOR PLAN 2
Patient with small bowel obstruction.  S/P Laparoscopic small bowel resection with anastomosis, POD # 3.   as above.
Patient with abdominal pain, POA due to small bowel obstruction.   Continue pain medications as needed.   Keep NPO  IV hydration.   Further management as per patient's clinical course.
Patient with abdominal pain, POA due to small bowel obstruction.   S/P lap SBR and anastomosis, POD # 1.   Monitor for return of bowel function.   Diet as per surgery.   Encourage ambulation.   Monitor labs.  Encourage incentive spirometry.
Patient with small bowel obstruction.  S/P Laparoscopic small bowel resection with anastomosis, POD # 2.   Continue IVF.   Diet as per surgery.

## 2023-04-29 NOTE — PROGRESS NOTE ADULT - TIME BILLING
Obtaining history/physical exam, reviewing labs and imaging studies, coordinating care with multidisciplinary team and nursing staff and discussing patient with surgical PA covering. Greater than 50% of the time was spent with direct face to face patient interaction, discussing with the patient and family, and answering all questions.
E&M

## 2023-04-29 NOTE — PROGRESS NOTE ADULT - PROBLEM SELECTOR PLAN 3
Patient with small bowel obstruction.  For small bowel resection today as per surgery.   Patient is medically cleared for surgery.
Patient with anemia of acute postop blood loss.  Patient is asymptomatic.  Continue to monitor serial CBC and transfuse as needed.
Patient with anemia of acute postop blood loss.  Patient is asymptomatic.  Continue to monitor serial CBC and transfuse as needed.
Patient with small bowel obstruction.  S/P Laparoscopic small bowel resection with anastomosis, POD # 1.   Continue IVF.   ? Need for antibiotics.   rest as above.

## 2023-04-29 NOTE — PROGRESS NOTE ADULT - PROVIDER SPECIALTY LIST ADULT
Surgery
Internal Medicine

## 2023-04-29 NOTE — PROGRESS NOTE ADULT - PROBLEM SELECTOR PLAN 6
Lovenox for DVT prophylaxis.
Continue Levothyroxine.   TSH normal.
Lovenox for DVT prophylaxis.
Lovenox for DVT prophylaxis.

## 2023-04-29 NOTE — PROGRESS NOTE ADULT - ASSESSMENT
Pt. seen and examined. She is doing well, has no complaints and is eager to go home  Diet activity and f/u discussed with the pt.

## 2023-04-29 NOTE — DISCHARGE NOTE NURSING/CASE MANAGEMENT/SOCIAL WORK - PATIENT PORTAL LINK FT
You can access the FollowMyHealth Patient Portal offered by [facility] by registering at the following website: [email or web address]. By joining [employer]’s FollowMyHealth portal, you will also be able to view your health information using other applications (apps) compatible with our system.

## 2023-04-29 NOTE — PROGRESS NOTE ADULT - PROBLEM SELECTOR PLAN 5
Continue Levothyroxine.   TSH normal.
Continue Levothyroxine.   TSH normal.
Patient is medically optimized for proposed procedure.  No contraindications to proceed procedure.
Continue Levothyroxine.   TSH normal.

## 2023-04-29 NOTE — DISCHARGE NOTE NURSING/CASE MANAGEMENT/SOCIAL WORK - NSDCPEFALRISK_GEN_ALL_CORE
For information on Fall & Injury Prevention, visit: [email or web address] OR  [email or web address] OR  https://www.cdc.gov/steadi/patient.html

## 2023-04-29 NOTE — PROGRESS NOTE ADULT - PROBLEM/PLAN-5
DISPLAY PLAN FREE TEXT
bones(Osteoporosis,prev fx,steroid use,metastatic bone ca)

## 2023-04-29 NOTE — PROGRESS NOTE ADULT - PROBLEM SELECTOR PLAN 1
Patient with hypotension but asymptomatic.   Improved with IVF.   Continue to monitor.   No clear indication for pressors at this time.
Patient with abdominal pain, POA due to small bowel obstruction.   S/P lap SBR and anastomosis, POD # 3.   Will advance diet to low residue diet and if patient tolerates lunch and if OK with surgery, will discharge home.   Discharge recommendations discussed with patient.
Patient with abdominal pain, POA due to small bowel obstruction.   S/P lap SBR and anastomosis, POD # 2.   Started on clear liquids, advance as per surgery.   Encourage ambulation.   Monitor labs.  Encourage incentive spirometry.
Improved.   Continue IVF.

## 2023-04-29 NOTE — PROGRESS NOTE ADULT - PROBLEM SELECTOR PLAN 4
improved with supplementation.
Replace potassium IV and repeat BMP in AM.
Replaced potassium IV.   Monitor BMP.
Improved with supplement.   Monitor BMP

## 2023-05-01 PROBLEM — Z00.00 ENCOUNTER FOR PREVENTIVE HEALTH EXAMINATION: Status: ACTIVE | Noted: 2023-05-01

## 2023-05-03 ENCOUNTER — NON-APPOINTMENT (OUTPATIENT)
Age: 38
End: 2023-05-03

## 2023-05-03 ENCOUNTER — APPOINTMENT (OUTPATIENT)
Dept: BARIATRICS | Facility: CLINIC | Age: 38
End: 2023-05-03
Payer: MEDICAID

## 2023-05-03 VITALS
TEMPERATURE: 97 F | DIASTOLIC BLOOD PRESSURE: 72 MMHG | HEIGHT: 65 IN | SYSTOLIC BLOOD PRESSURE: 116 MMHG | WEIGHT: 140 LBS | HEART RATE: 81 BPM | BODY MASS INDEX: 23.32 KG/M2 | OXYGEN SATURATION: 99 %

## 2023-05-03 DIAGNOSIS — Z83.3 FAMILY HISTORY OF DIABETES MELLITUS: ICD-10-CM

## 2023-05-03 DIAGNOSIS — E07.9 DISORDER OF THYROID, UNSPECIFIED: ICD-10-CM

## 2023-05-03 DIAGNOSIS — Z92.29 PERSONAL HISTORY OF OTHER DRUG THERAPY: ICD-10-CM

## 2023-05-03 DIAGNOSIS — Z78.9 OTHER SPECIFIED HEALTH STATUS: ICD-10-CM

## 2023-05-03 DIAGNOSIS — K29.70 GASTRITIS, UNSPECIFIED, W/OUT BLEEDING: ICD-10-CM

## 2023-05-03 PROCEDURE — 99024 POSTOP FOLLOW-UP VISIT: CPT

## 2023-05-03 RX ORDER — LEVOTHYROXINE SODIUM 75 UG/1
75 TABLET ORAL DAILY
Refills: 0 | Status: ACTIVE | COMMUNITY

## 2023-05-03 NOTE — ASSESSMENT
[FreeTextEntry1] : 37F here for follow-up status post laparoscopic small bowel resection for  Meckel's diverticulum, recovering well\par -Continue low fiber diet, slowly start reintroducing some fiber into diet as tolerated\par -Continue ambulation and stay active\par -No heavy lifting more than 10 pounds\par -Continue Tylenol as needed for pain, patient encouraged to take Tylenol more often if needed, but no more than every 6 hrs\par -Call with questions or concerns\par -Follow-up in 2 weeks to reevaluate abdominal pain

## 2023-05-03 NOTE — PHYSICAL EXAM
[Alert] : alert [Calm] : calm [de-identified] : Sitting comfortably, no acute distress [de-identified] : Normocephalic, atraumatic. Anicteric. [de-identified] : Supple [de-identified] : Equal chest rise, nonlabored respirations. No audible wheezing. [de-identified] : Regular rate and rhythm. [de-identified] : Abdomen soft, nondistended, nontender. Well healing incisions. No drainage or erythema. [de-identified] : No obvious deformity, normal gait

## 2023-05-03 NOTE — HISTORY OF PRESENT ILLNESS
[de-identified] : 37-year-old female with recent hospitalization for SBO secondary to stricture associated with Meckel's diverticulum.  Patient underwent a laparoscopic small bowel resection and recovered well postoperatively.  She is here for postop visit.\par \par No nausea/vomiting, or fevers/chills\par Notes incisional pain, taking Tylenol prn .  Does not use Motrin due to gastritis.  Abdominal pain overall improving\par Notes reflux after certain foods, has omeprazole at home that she takes as needed\par Daily BMs no constipation or diarrhea

## 2023-05-03 NOTE — DATA REVIEWED
[FreeTextEntry1] : ACCESSION No:  40 [id number]\par Patient:   YESSENIA COLEMAN\par \par \par Accession:                             [id number]\par \par Collected Date/Time:                   4/26/2023 16:31 EDT\par Received Date/Time:                    4/27/2023 09:16 EDT\par \par Surgical Pathology Report - Auth (Verified)\par \par Specimen(s) Submitted\par Portion of ileum containing Meckel's diverticulum and stricture\par \par Final Diagnosis\par 1. Portion of ileum\par Segment of ileum with Meckel's diverticulum and focal stricture showing\par transmural acute and chronic inflammation and mucosal ulceration.\par \par Verified by: Juan Jose Moya MD\par (Electronic Signature)\par Reported on: 04/28/23 14:32 EDT, [facility], [address] [place] [address],\[place] [address]\par _________________________________________________________________\par \par Clinical Information\par Procedure:  Laparoscopic small bowel resection\par \par \par Perioperative Diagnosis\par Small bowel obstruction, Meckel's Diverticulum, inflammatory stricture\par \par Postoperative Diagnosis\par Same\par \par Gross Description\par Received:  In formalin labeled  "portion of ileum containing Meckel's\par diverticulum and stricture"\par Integrity:  Disrupted\par Orientation:  None provided\par One End: Stapled 5.5 cm in circumference, inked blue\par \par Opposing End: Stapled measuring 3.5 cm in circumference, inked\par green\par Small Bowel\par Length:  12.0 cmCircumference:  Up to 7.5 cm\par Wall Thickness:  0.1 cm to 0.4 cm\par Serosa:  Dusky tan-pink, smooth\par Mesentery:  1.5 cm\par Area of Interest - : Meckel's diverticulum:    measuring 3.6 x 3.2 x 3.0\par cm,\par generally smooth lined measuring 0.1 cm in thickness\par Location:  Bowel wall extending into the serosa\par Cut Surface:  Smooth lined tan\par Distance to Margin:  6.5 cm from the resected margin inked blue,\par approximately 3.0 cm from the disrupted segment of bowel containing\par resected margin inked green\par Additional Comments:  The bowel containing the resected margin inked\par green is narrowed to 3.0 cm in circumference and may represent the\par stricture\par Mucosa:  The mucosa adjacent to the stapled margin inked blue for a length\par of 8.5 cm is flattened, the bowel wall in this region measures 0.1 cm in\par thickness\par Additional Comments:   The bowel is received pack with green fecal\par material\par Inking\par Submitted:  Representative sections in 4 cassettes:\par 1A-1B: Opposite resected margins\par 1C: Segment of diverticulum with adjacent flattened bowel\par 1D: Section of diverticulum adjacent to most probable stricture\par \par Wendy RIBEIRO 04/27/2023 12:14 PM\par \par Disclaimer\par In addition to other data that may appear on the specimen containers, all\par labels have been inspected to confirm the presence of the patient's name\par and date of birth.\par Gross examination, histological processing and microscopic evaluation\par performed at [facility], Department of Pathology, [address] [place] [address].

## 2023-05-19 ENCOUNTER — APPOINTMENT (OUTPATIENT)
Dept: BARIATRICS | Facility: CLINIC | Age: 38
End: 2023-05-19
Payer: MEDICAID

## 2023-05-19 VITALS
HEIGHT: 65 IN | HEART RATE: 61 BPM | DIASTOLIC BLOOD PRESSURE: 62 MMHG | OXYGEN SATURATION: 98 % | WEIGHT: 135 LBS | SYSTOLIC BLOOD PRESSURE: 90 MMHG | TEMPERATURE: 97.1 F | BODY MASS INDEX: 22.49 KG/M2

## 2023-05-19 PROCEDURE — 99024 POSTOP FOLLOW-UP VISIT: CPT

## 2023-05-19 NOTE — PHYSICAL EXAM
[Alert] : alert [Calm] : calm [de-identified] : Sitting comfortably, no acute distress [de-identified] : Normocephalic, atraumatic. Anicteric. [de-identified] : Supple [de-identified] : Equal chest rise, nonlabored respirations. No audible wheezing. [de-identified] : Regular rate and rhythm. [de-identified] : Abdomen soft, nondistended, nontender. Well healing incisions. No drainage or erythema. [de-identified] : No obvious deformity, normal gait

## 2023-05-19 NOTE — HISTORY OF PRESENT ILLNESS
[de-identified] : 37-year-old female with recent hospitalization for SBO secondary to stricture associated with Meckel's diverticulum. Patient underwent a laparoscopic small bowel resection and recovered well postoperatively. She is here for follow up visit.\par Notes some constipation. Drinking plenty of water\par Is tolerating lowfiber diet\par Is back to baseline activities\par Occasionally gets mild pain with movements but much improved

## 2023-05-19 NOTE — ASSESSMENT
[FreeTextEntry1] : 37F here for follow-up status post laparoscopic small bowel resection for Meckel's diverticulum, recovering well\par -Resume regular diet- can reintroduce fiber \par -Take miralax as needed for constipation and drink plenty of water\par -Continue ambulation and stay active\par -No heavy lifting more than 10 pounds until 6 weeks post op\par -Continue Tylenol as needed for pain \par -Call with questions or concerns\par -Follow-up in 3 months\par -follow up with PCP regarding routine healthcare

## 2023-07-12 ENCOUNTER — APPOINTMENT (OUTPATIENT)
Dept: BARIATRICS | Facility: CLINIC | Age: 38
End: 2023-07-12
Payer: MEDICAID

## 2023-07-12 VITALS
BODY MASS INDEX: 23.16 KG/M2 | SYSTOLIC BLOOD PRESSURE: 100 MMHG | HEIGHT: 65 IN | WEIGHT: 139 LBS | DIASTOLIC BLOOD PRESSURE: 70 MMHG | HEART RATE: 62 BPM | TEMPERATURE: 96.7 F | OXYGEN SATURATION: 99 %

## 2023-07-12 DIAGNOSIS — K59.00 CONSTIPATION, UNSPECIFIED: ICD-10-CM

## 2023-07-12 DIAGNOSIS — D17.1 BENIGN LIPOMATOUS NEOPLASM OF SKIN AND SUBCUTANEOUS TISSUE OF TRUNK: ICD-10-CM

## 2023-07-12 PROCEDURE — 99214 OFFICE O/P EST MOD 30 MIN: CPT | Mod: 24

## 2023-07-12 NOTE — PHYSICAL EXAM
[No Rash or Lesion] : No rash or lesion [Alert] : alert [Calm] : calm [de-identified] : Sitting comfortably, no acute distress [de-identified] : Normocephalic, atraumatic. Anicteric. [de-identified] : Supple [de-identified] : Equal chest rise, nonlabored respirations. No audible wheezing. [de-identified] : Regular rate and rhythm. [de-identified] : Soft, nondistended, nontender.  Well-healed laparoscopic incisions. [de-identified] : Left side small mobile subcutaneous mass without any overlying skin changes, possible lipoma

## 2023-07-12 NOTE — ASSESSMENT
[FreeTextEntry1] : 37-year-old female who is status post laparoscopic small bowel resection for Meckel's diverticulum here for follow-up. Recovering well from surgery.\par \par Chronic constipation:\par -Will refer to GI for worsening constipation, patient has never had a colonoscopy\par -Will consider CT scan if no significant findings on colonoscopy\par \par Left back soft tissue mass, consistent on exam with lipoma\par -Will obtain ultrasound to work-up \par \par \par -Follow-up in 3 months after above work-up\par -All questions answered\par -Call with questions or concerns

## 2023-07-12 NOTE — HISTORY OF PRESENT ILLNESS
[de-identified] : 37-year-old female h/o hospitalization for SBO secondary to stricture associated with Meckel's diverticulum. Patient underwent a laparoscopic small bowel resection and recovered well postoperatively.  She presents for follow-up\par Patient notes a bloating/pressure-like feeling in her left hemiabdomen associated with worsening constipation.  She takes MiraLAX daily, sometimes twice a day and states that she has 1 bowel movement every 3 days.  She has a history of constipation prior to surgery but feels like it is worsening now.  She continues to drink plenty of water and eat high-fiber foods without any improvement in her constipation.\par No nausea/vomiting or abdominal pain\par \par She also notes a lump in her left side/back that is nontender that she noticed recently.  No associated symptoms

## 2023-07-24 ENCOUNTER — APPOINTMENT (OUTPATIENT)
Dept: ULTRASOUND IMAGING | Facility: CLINIC | Age: 38
End: 2023-07-24
Payer: MEDICAID

## 2023-07-24 ENCOUNTER — OUTPATIENT (OUTPATIENT)
Dept: OUTPATIENT SERVICES | Facility: HOSPITAL | Age: 38
LOS: 1 days | End: 2023-07-24
Payer: MEDICAID

## 2023-07-24 DIAGNOSIS — D17.1 BENIGN LIPOMATOUS NEOPLASM OF SKIN AND SUBCUTANEOUS TISSUE OF TRUNK: ICD-10-CM

## 2023-07-24 DIAGNOSIS — Z90.49 ACQUIRED ABSENCE OF OTHER SPECIFIED PARTS OF DIGESTIVE TRACT: Chronic | ICD-10-CM

## 2023-07-24 DIAGNOSIS — K59.00 CONSTIPATION, UNSPECIFIED: ICD-10-CM

## 2023-07-24 PROCEDURE — 76705 ECHO EXAM OF ABDOMEN: CPT

## 2023-07-24 PROCEDURE — 76705 ECHO EXAM OF ABDOMEN: CPT | Mod: 26

## 2023-08-02 ENCOUNTER — APPOINTMENT (OUTPATIENT)
Dept: CT IMAGING | Facility: CLINIC | Age: 38
End: 2023-08-02

## 2023-08-30 ENCOUNTER — OUTPATIENT (OUTPATIENT)
Dept: OUTPATIENT SERVICES | Facility: HOSPITAL | Age: 38
LOS: 1 days | End: 2023-08-30
Payer: MEDICAID

## 2023-08-30 ENCOUNTER — APPOINTMENT (OUTPATIENT)
Dept: CT IMAGING | Facility: CLINIC | Age: 38
End: 2023-08-30
Payer: MEDICAID

## 2023-08-30 DIAGNOSIS — Z00.8 ENCOUNTER FOR OTHER GENERAL EXAMINATION: ICD-10-CM

## 2023-08-30 DIAGNOSIS — Z90.49 ACQUIRED ABSENCE OF OTHER SPECIFIED PARTS OF DIGESTIVE TRACT: Chronic | ICD-10-CM

## 2023-08-30 PROCEDURE — 74177 CT ABD & PELVIS W/CONTRAST: CPT | Mod: 26

## 2023-08-30 PROCEDURE — 74177 CT ABD & PELVIS W/CONTRAST: CPT

## 2023-09-05 NOTE — PATIENT PROFILE ADULT - CAREGIVER PHONE NUMBER
[de-identified] : Constitutional: - No acute distress - Well developed; well nourished  Neurological: - normal mood and affect - alert and oriented x 3  Cardiovascular: - grossly normal  Lumbar Spine Exam:  Inspection:  erythema (-)  ecchymosis (-)  rashes (-)  alignment: Lumbar Scoliosis  Palpation:  Midline lumbar tenderness:            (+)  midline thoracic tenderness:          (-)  Lumbar paraspinal tenderness:  L (+) ; R (+)  thoracic paraspinal tenderness: L (-) ; R (-)  sciatic nerve tenderness :          L (-) ; R (-)  SI joint tenderness:                     L (-) ; R (-)  GTB tenderness:                        L (-);  R (-)   ROM: reduced all planes Pain throughout range of motion testing  Strength:                                     Right       Left     Hip Flexion:                (5/5)       (5/5)  Quadriceps:               (5/5)       (5/5)  Hamstrings:                (5/5)       (5/5)  Ankle Dorsiflexion:     (5/5)       (5/5)  EHL:                           (5/5)       (5/5)  Ankle Plantarflexion:  (5/5)       (5/5)   Special Tests:  SLR:                            R (eq) ; L (eq)  Facet loading:             R (+) ; L (+)  GILSON test:                R (-) ; L (-)  Hamstring tightness:   R (+);  L (+)   Neurologic:  SILT throughout right lower extremity  SILT throughout left lower extremity   Reflexes: Areflexic bilateral lower extremities  Gait:  mildly antalgic gait  ambulates without assistive device 
[id number]

## 2023-10-11 ENCOUNTER — APPOINTMENT (OUTPATIENT)
Dept: SURGERY | Facility: CLINIC | Age: 38
End: 2023-10-11

## 2023-10-11 ENCOUNTER — APPOINTMENT (OUTPATIENT)
Dept: BARIATRICS | Facility: CLINIC | Age: 38
End: 2023-10-11

## 2023-10-12 NOTE — ED ADULT NURSE NOTE - NSICDXPASTSURGICALHX_GEN_ALL_CORE_FT
Pt with left sided ear pain x 2 weeks. Denies fever.
PAST SURGICAL HISTORY:  History of cholecystectomy

## 2023-12-20 NOTE — ED ADULT NURSE NOTE - CAS ELECT INFOMATION PROVIDED
"SUBJECTIVE:   Jose De Jesus is a 67 year old, presenting for the following:  Annual Visit        12/20/2023     7:40 AM   Additional Questions   Roomed by Rosalio   Accompanied by self       Are you in the first 12 months of your Medicare coverage?  No    Healthy Habits:     In general, how would you rate your overall health?  Good    Frequency of exercise:  6-7 days/week    Duration of exercise:  30-45 minutes    Do you usually eat at least 4 servings of fruit and vegetables a day, include whole grains    & fiber and avoid regularly eating high fat or \"junk\" foods?  Yes    Taking medications regularly:  Yes    Medication side effects:  Muscle aches    Ability to successfully perform activities of daily living:  No assistance needed    Home Safety:  Throw rugs in the hallway    Hearing Impairment:  No hearing concerns    In the past 6 months, have you been bothered by leaking of urine? Yes    In general, how would you rate your overall mental or emotional health?  Good    Additional concerns today:  No      Today's PHQ-2 Score:       12/20/2023     7:35 AM   PHQ-2 ( 1999 [employer])   Q1: Little interest or pleasure in doing things 0   Q2: Feeling down, depressed or hopeless 0   PHQ-2 Score 0   Q1: Little interest or pleasure in doing things Not at all   Q2: Feeling down, depressed or hopeless Not at all   PHQ-2 Score 0           Have you ever done Advance Care Planning? (For example, a Health Directive, POLST, or a discussion with a medical provider or your loved ones about your wishes): Yes, patient states has an Advance Care Planning document and will bring a copy to the clinic.       Fall risk  Fallen 2 or more times in the past year?: No  Any fall with injury in the past year?: No    Cognitive Screening   1) Repeat 3 items (Leader, Season, Table)      2) Clock draw: NORMAL  3) 3 item recall: Recalls 3 objects  Results: 3 items recalled: COGNITIVE IMPAIRMENT LESS LIKELY    Mini-CogTM Copyright S Logan. Licensed by the author " for use in [facility]; reprinted with permission (soangelique@.[facility]). All rights reserved.      Do you have sleep apnea, excessive snoring or daytime drowsiness? : yes    Reviewed and updated as needed this visit by clinical staff   Tobacco  Allergies  Meds              Reviewed and updated as needed this visit by Provider                 Social History     Tobacco Use    Smoking status: Never    Smokeless tobacco: Never   Substance Use Topics    Alcohol use: Yes     Comment: very little, a beer or two or a glass of wine 2X a week             12/20/2023     7:35 AM   Alcohol Use   Prescreen: >3 drinks/day or >7 drinks/week? No     Do you have a current opioid prescription? No  Do you use any other controlled substances or medications that are not prescribed by a provider? Alcohol and Cannabis              Current providers sharing in care for this patient include:  Patient Care Team:  David Real MD as PCP - General (Family Practice)  David Real MD as Assigned PCP  Ben Hernandes DO as Assigned Sleep Provider    The following health maintenance items are reviewed in Epic and correct as of today:  Health Maintenance   Topic Date Due    RSV VACCINE (Pregnancy & 60+) (1 - 1-dose 60+ series) Never done    AORTIC ANEURYSM SCREENING (SYSTEM ASSIGNED)  Never done    LIPID  12/13/2023    ANNUAL REVIEW OF HM ORDERS  12/13/2023    MEDICARE ANNUAL WELLNESS VISIT  12/13/2023    FALL RISK ASSESSMENT  12/20/2024    ADVANCE CARE PLANNING  12/15/2027    COLORECTAL CANCER SCREENING  01/05/2028    DTAP/TDAP/TD IMMUNIZATION (3 - Td or Tdap) 11/17/2030    HEPATITIS C SCREENING  Completed    PHQ-2 (once per calendar year)  Completed    INFLUENZA VACCINE  Completed    Pneumococcal Vaccine: 65+ Years  Completed    ZOSTER IMMUNIZATION  Completed    COVID-19 Vaccine  Completed    IPV IMMUNIZATION  Aged Out    HPV IMMUNIZATION  Aged Out    MENINGITIS IMMUNIZATION  Aged Out    RSV MONOCLONAL ANTIBODY  " Aged Out     Lost around 30lbs in last 3 months. Exercise and eating well.   Few consultation per month. Pays well. Not working otherwise.     [facility], right ankle swelling      Review of Systems   Constitutional:  Negative for chills and fever.   HENT:  Positive for sore throat. Negative for congestion, ear pain and hearing loss.    Eyes:  Negative for pain and visual disturbance.   Respiratory:  Negative for cough and shortness of breath.    Cardiovascular:  Negative for chest pain, palpitations and peripheral edema.   Gastrointestinal:  Negative for abdominal pain, constipation, diarrhea, heartburn, hematochezia and nausea.   Genitourinary:  Positive for impotence. Negative for dysuria, frequency, genital sores, hematuria, penile discharge and urgency.   Musculoskeletal:  Positive for arthralgias, joint swelling and myalgias.   Skin:  Negative for rash.   Neurological:  Negative for dizziness, weakness, headaches and paresthesias.   Psychiatric/Behavioral:  Negative for mood changes. The patient is not nervous/anxious.      OBJECTIVE:   BP (!) 148/84 (BP Location: Right arm, Patient Position: Sitting, Cuff Size: Adult Large)   Pulse 72   Temp 97.5  F (36.4  C) (Temporal)   Resp 20   Ht 1.809 m (5' 11.22\")   Wt 107.6 kg (237 lb 3.2 oz)   SpO2 99%   BMI 32.88 kg/m   Estimated body mass index is 32.88 kg/m  as calculated from the following:    Height as of this encounter: 1.809 m (5' 11.22\").    Weight as of this encounter: 107.6 kg (237 lb 3.2 oz).  Physical Exam  GENERAL: healthy, alert and no distress  EYES: Eyes grossly normal to inspection, PERRL and conjunctivae and sclerae normal  HENT: ear canals and TM's normal, nose and mouth without ulcers or lesions  NECK: no adenopathy, no asymmetry, masses, or scars and thyroid normal to palpation  RESP: lungs clear to auscultation - no rales, rhonchi or wheezes  CV: regular rate and rhythm, normal S1 S2, no S3 or S4, no " "murmur, click or rub, no peripheral edema and peripheral pulses strong  ABDOMEN: soft, nontender, no hepatosplenomegaly, no masses and bowel sounds normal  MS: no gross musculoskeletal defects noted, right ankle swelling  SKIN: no suspicious lesions or rashes  NEURO: Normal strength and tone, mentation intact and speech normal  PSYCH: mentation appears normal, affect normal/bright    ASSESSMENT / PLAN:   (Z00.00) Encounter for Medicare annual wellness exam  (primary encounter diagnosis)  Comment:    Plan:      (E78.5) Hyperlipidemia LDL goal <130  Comment:  Patient is tolerating current medication without any major side effects of concerns and current dose seems reasonable too.  Current medication regime is effective. Continue current treatment without any changes.  We briefly discussed about higher potency statin or higher dose of statin but at this point really not to adjust it.  Plan: simvastatin (ZOCOR) 20 MG tablet, Lipid panel         reflex to direct LDL Fasting, Comprehensive         metabolic panel (BMP + Alb, Alk Phos, ALT, AST,        Total. Bili, TP)             (R73.09) Elevated glucose  Comment:    Plan: Hemoglobin A1c    Elevated blood-pressure reading without diagnosis of hypertension  Comment -going to check his blood pressure at home and will notify me in 1 month.  Understands her the nature of hypertension and its complications.                   COUNSELING:  Reviewed preventive health counseling, as reflected in patient instructions      BMI:   Estimated body mass index is 32.88 kg/m  as calculated from the following:    Height as of this encounter: 1.809 m (5' 11.22\").    Weight as of this encounter: 107.6 kg (237 lb 3.2 oz).   Weight management plan: Discussed healthy diet and exercise guidelines      He reports that he has never smoked. He has never used smokeless tobacco.      Appropriate preventive services were discussed with this patient, including applicable screening as appropriate for " fall prevention, nutrition, physical activity, Tobacco-use cessation, weight loss and cognition.  Checklist reviewing preventive services available has been given to the patient.    Reviewed patients plan of care and provided an AVS. The Basic Care Plan (routine screening as documented in Health Maintenance) for Jose De Jesus meets the Care Plan requirement. This Care Plan has been established and reviewed with the Patient.          David Real MD, MD  [facility]    Identified Health Risks:  I have reviewed Opioid Use Disorder and Substance Use Disorder risk factors and made any needed referrals. Information on urinary incontinence and treatment options given to patient.   DC instructions

## 2024-01-06 NOTE — DISCHARGE NOTE PROVIDER - NSDCACTIVITY_GEN_ALL_CORE
Addended by: BERNARDO GILBERT on: 1/6/2024 01:27 PM     Modules accepted: Level of Service    
Do not drive or operate machinery/Showering allowed/Do not make important decisions/Stairs allowed/Walking - Indoors allowed/No heavy lifting/straining/Walking - Outdoors allowed/Follow Instructions Provided by your Surgical Team

## 2024-03-04 NOTE — ED ADULT NURSE NOTE - NSICDXPASTMEDICALHX_GEN_ALL_CORE_FT
Problem: Adult Inpatient Plan of Care  Goal: Patient-Specific Goal (Individualized)  Outcome: Ongoing, Progressing  Goal: Absence of Hospital-Acquired Illness or Injury  Outcome: Ongoing, Progressing  Goal: Optimal Comfort and Wellbeing  Outcome: Ongoing, Progressing     Problem: Restraint, Nonbehavioral (Nonviolent)  Goal: Absence of Harm or Injury  Outcome: Met     Problem: Infection  Goal: Absence of Infection Signs and Symptoms  Outcome: Ongoing, Progressing     Problem: Skin Injury Risk Increased  Goal: Skin Health and Integrity  Outcome: Ongoing, Progressing      PAST MEDICAL HISTORY:  Hypothyroid

## 2024-05-03 ENCOUNTER — EMERGENCY (EMERGENCY)
Facility: HOSPITAL | Age: 39
LOS: 1 days | Discharge: HOME | End: 2024-05-03
Attending: EMERGENCY MEDICINE | Admitting: EMERGENCY MEDICINE
Payer: MEDICAID

## 2024-05-03 DIAGNOSIS — Z90.49 ACQUIRED ABSENCE OF OTHER SPECIFIED PARTS OF DIGESTIVE TRACT: Chronic | ICD-10-CM

## 2024-05-03 LAB
ALBUMIN SERPL ELPH-MCNC: 3.9 G/DL — SIGNIFICANT CHANGE UP (ref 3.3–5)
ALP SERPL-CCNC: 99 U/L — SIGNIFICANT CHANGE UP (ref 30–120)
ALT FLD-CCNC: 38 U/L — SIGNIFICANT CHANGE UP (ref 10–60)
ANION GAP SERPL CALC-SCNC: 6 MMOL/L — SIGNIFICANT CHANGE UP (ref 5–17)
APPEARANCE UR: CLEAR — SIGNIFICANT CHANGE UP
APTT BLD: 32.6 SEC — SIGNIFICANT CHANGE UP (ref 24.5–35.6)
AST SERPL-CCNC: 27 U/L — SIGNIFICANT CHANGE UP (ref 10–40)
BACTERIA # UR AUTO: ABNORMAL /HPF
BASOPHILS # BLD AUTO: 0.02 K/UL — SIGNIFICANT CHANGE UP (ref 0–0.2)
BASOPHILS NFR BLD AUTO: 0.3 % — SIGNIFICANT CHANGE UP (ref 0–2)
BILIRUB SERPL-MCNC: 0.5 MG/DL — SIGNIFICANT CHANGE UP (ref 0.2–1.2)
BILIRUB UR-MCNC: NEGATIVE — SIGNIFICANT CHANGE UP
BUN SERPL-MCNC: 10 MG/DL — SIGNIFICANT CHANGE UP (ref 7–23)
CALCIUM SERPL-MCNC: 8.6 MG/DL — SIGNIFICANT CHANGE UP (ref 8.4–10.5)
CHLORIDE SERPL-SCNC: 104 MMOL/L — SIGNIFICANT CHANGE UP (ref 96–108)
CO2 SERPL-SCNC: 28 MMOL/L — SIGNIFICANT CHANGE UP (ref 22–31)
COLOR SPEC: YELLOW — SIGNIFICANT CHANGE UP
CREAT SERPL-MCNC: 0.59 MG/DL — SIGNIFICANT CHANGE UP (ref 0.5–1.3)
DIFF PNL FLD: ABNORMAL
EGFR: 118 ML/MIN/1.73M2 — SIGNIFICANT CHANGE UP
EOSINOPHIL # BLD AUTO: 0.09 K/UL — SIGNIFICANT CHANGE UP (ref 0–0.5)
EOSINOPHIL NFR BLD AUTO: 1.3 % — SIGNIFICANT CHANGE UP (ref 0–6)
EPI CELLS # UR: PRESENT
GLUCOSE SERPL-MCNC: 93 MG/DL — SIGNIFICANT CHANGE UP (ref 70–99)
GLUCOSE UR QL: NEGATIVE MG/DL — SIGNIFICANT CHANGE UP
HCG UR QL: NEGATIVE — SIGNIFICANT CHANGE UP
HCT VFR BLD CALC: 39.2 % — SIGNIFICANT CHANGE UP (ref 34.5–45)
HGB BLD-MCNC: 13 G/DL — SIGNIFICANT CHANGE UP (ref 11.5–15.5)
IMM GRANULOCYTES NFR BLD AUTO: 1.2 % — HIGH (ref 0–0.9)
INR BLD: 0.94 RATIO — SIGNIFICANT CHANGE UP (ref 0.85–1.18)
KETONES UR-MCNC: NEGATIVE MG/DL — SIGNIFICANT CHANGE UP
LEUKOCYTE ESTERASE UR-ACNC: NEGATIVE — SIGNIFICANT CHANGE UP
LIDOCAIN IGE QN: 29 U/L — SIGNIFICANT CHANGE UP (ref 16–77)
LYMPHOCYTES # BLD AUTO: 2.13 K/UL — SIGNIFICANT CHANGE UP (ref 1–3.3)
LYMPHOCYTES # BLD AUTO: 31.5 % — SIGNIFICANT CHANGE UP (ref 13–44)
MCHC RBC-ENTMCNC: 28.8 PG — SIGNIFICANT CHANGE UP (ref 27–34)
MCHC RBC-ENTMCNC: 33.2 GM/DL — SIGNIFICANT CHANGE UP (ref 32–36)
MCV RBC AUTO: 86.7 FL — SIGNIFICANT CHANGE UP (ref 80–100)
MONOCYTES # BLD AUTO: 0.63 K/UL — SIGNIFICANT CHANGE UP (ref 0–0.9)
MONOCYTES NFR BLD AUTO: 9.3 % — SIGNIFICANT CHANGE UP (ref 2–14)
NEUTROPHILS # BLD AUTO: 3.82 K/UL — SIGNIFICANT CHANGE UP (ref 1.8–7.4)
NEUTROPHILS NFR BLD AUTO: 56.4 % — SIGNIFICANT CHANGE UP (ref 43–77)
NITRITE UR-MCNC: NEGATIVE — SIGNIFICANT CHANGE UP
NRBC # BLD: 0 /100 WBCS — SIGNIFICANT CHANGE UP (ref 0–0)
PH UR: 6 — SIGNIFICANT CHANGE UP (ref 5–8)
PLATELET # BLD AUTO: 199 K/UL — SIGNIFICANT CHANGE UP (ref 150–400)
POTASSIUM SERPL-MCNC: 4.1 MMOL/L — SIGNIFICANT CHANGE UP (ref 3.5–5.3)
POTASSIUM SERPL-SCNC: 4.1 MMOL/L — SIGNIFICANT CHANGE UP (ref 3.5–5.3)
PROT SERPL-MCNC: 7.7 G/DL — SIGNIFICANT CHANGE UP (ref 6–8.3)
PROT UR-MCNC: NEGATIVE MG/DL — SIGNIFICANT CHANGE UP
PROTHROM AB SERPL-ACNC: 10.3 SEC — SIGNIFICANT CHANGE UP (ref 9.5–13)
RBC # BLD: 4.52 M/UL — SIGNIFICANT CHANGE UP (ref 3.8–5.2)
RBC # FLD: 12.9 % — SIGNIFICANT CHANGE UP (ref 10.3–14.5)
RBC CASTS # UR COMP ASSIST: 1 /HPF — SIGNIFICANT CHANGE UP (ref 0–4)
SODIUM SERPL-SCNC: 138 MMOL/L — SIGNIFICANT CHANGE UP (ref 135–145)
SP GR SPEC: 1.01 — SIGNIFICANT CHANGE UP (ref 1–1.03)
TROPONIN I, HIGH SENSITIVITY RESULT: <4 NG/L — SIGNIFICANT CHANGE UP
UROBILINOGEN FLD QL: 0.2 MG/DL — SIGNIFICANT CHANGE UP (ref 0.2–1)
WBC # BLD: 6.77 K/UL — SIGNIFICANT CHANGE UP (ref 3.8–10.5)
WBC # FLD AUTO: 6.77 K/UL — SIGNIFICANT CHANGE UP (ref 3.8–10.5)
WBC UR QL: 0 /HPF — SIGNIFICANT CHANGE UP (ref 0–5)

## 2024-05-03 PROCEDURE — 85610 PROTHROMBIN TIME: CPT

## 2024-05-03 PROCEDURE — 93010 ELECTROCARDIOGRAM REPORT: CPT

## 2024-05-03 PROCEDURE — 93005 ELECTROCARDIOGRAM TRACING: CPT

## 2024-05-03 PROCEDURE — 85730 THROMBOPLASTIN TIME PARTIAL: CPT

## 2024-05-03 PROCEDURE — 96374 THER/PROPH/DIAG INJ IV PUSH: CPT | Mod: XU

## 2024-05-03 PROCEDURE — 74177 CT ABD & PELVIS W/CONTRAST: CPT | Mod: 26

## 2024-05-03 PROCEDURE — 74177 CT ABD & PELVIS W/CONTRAST: CPT

## 2024-05-03 PROCEDURE — 85025 COMPLETE CBC W/AUTO DIFF WBC: CPT

## 2024-05-03 PROCEDURE — 36415 COLL VENOUS BLD VENIPUNCTURE: CPT

## 2024-05-03 PROCEDURE — 99285 EMERGENCY DEPT VISIT HI MDM: CPT | Mod: 25

## 2024-05-03 PROCEDURE — 96375 TX/PRO/DX INJ NEW DRUG ADDON: CPT

## 2024-05-03 PROCEDURE — 81001 URINALYSIS AUTO W/SCOPE: CPT

## 2024-05-03 PROCEDURE — 80053 COMPREHEN METABOLIC PANEL: CPT

## 2024-05-03 PROCEDURE — 81025 URINE PREGNANCY TEST: CPT

## 2024-05-03 PROCEDURE — 99285 EMERGENCY DEPT VISIT HI MDM: CPT

## 2024-05-03 PROCEDURE — 83690 ASSAY OF LIPASE: CPT

## 2024-05-03 PROCEDURE — 84484 ASSAY OF TROPONIN QUANT: CPT

## 2024-09-09 ENCOUNTER — APPOINTMENT (OUTPATIENT)
Dept: BARIATRICS | Facility: CLINIC | Age: 39
End: 2024-09-09
Payer: COMMERCIAL

## 2024-09-09 VITALS
HEART RATE: 80 BPM | DIASTOLIC BLOOD PRESSURE: 64 MMHG | HEIGHT: 65 IN | SYSTOLIC BLOOD PRESSURE: 102 MMHG | TEMPERATURE: 98.6 F | BODY MASS INDEX: 23.51 KG/M2 | OXYGEN SATURATION: 97 % | WEIGHT: 141.09 LBS

## 2024-09-09 DIAGNOSIS — K59.00 CONSTIPATION, UNSPECIFIED: ICD-10-CM

## 2024-09-09 DIAGNOSIS — R10.9 UNSPECIFIED ABDOMINAL PAIN: ICD-10-CM

## 2024-09-09 DIAGNOSIS — D17.1 BENIGN LIPOMATOUS NEOPLASM OF SKIN AND SUBCUTANEOUS TISSUE OF TRUNK: ICD-10-CM

## 2024-09-09 PROCEDURE — 99214 OFFICE O/P EST MOD 30 MIN: CPT

## 2024-09-09 NOTE — ASSESSMENT
[FreeTextEntry1] : 38-year-old female who is status post laparoscopic small bowel resection for Meckel's diverticulum here for follow-up now with chronic constipation CT reviewed with patient - no obvious cause of her symptoms on CT Patients symptoms were present prior to surgery but now have worsened.  We discussed possibility of scar tissue but only a diagnostic laparoscopy would determine if adhesions are present. I suggested the patient first try a bowel regimen for 3 months to see if it improves her symptoms prior to considering a diagnostic laparoscopy. In addition, we discussed that her symptoms could be due to small bowel dysmotility which would not be improved by surgery, and may even worsen further after another operation to manipulate her bowel. Patient verbalized understanding and agreeable to first trying a bowel regimen  Abdominal discomfort 2/2 Chronic constipation  -f/u with GI and continue medications as prescribed -drink plenty of water, avoid fresh vegetables and high fiber that worsens symptoms. Eat a well balanced meal -Consider exercise  Left back soft tissue mass, consistent on exam with lipoma -no obvious findings on US -will monitor for now  -Follow-up in 3 months  -All questions answered -Call with questions or concerns.

## 2024-09-09 NOTE — DATA REVIEWED
[FreeTextEntry1] : ACC: [id number] EXAM: CT ABDOMEN AND PELVIS IC ORDERED BY: YELENA COE  PROCEDURE DATE: 05/03/2024    INTERPRETATION: CLINICAL INFORMATION: Abdominal pain  COMPARISON: 8/30/2023  CONTRAST/COMPLICATIONS: IV Contrast: Omnipaque 350 90 cc administered 10 cc discarded Oral Contrast: NONE Complications: None reported at time of study completion  PROCEDURE: CT of the Abdomen and Pelvis was performed. Sagittal and coronal reformats were performed.  FINDINGS: LOWER CHEST: Within normal limits.  LIVER: An area of focal fatty deposition is seen in the left hepatic lobe adjacent to the falciform ligament. BILE DUCTS: Mild dilatation. GALLBLADDER: Cholecystectomy. SPLEEN: Within normal limits. PANCREAS: Within normal limits. ADRENALS: Within normal limits. KIDNEYS/URETERS: Within normal limits.  BLADDER: Within normal limits. REPRODUCTIVE ORGANS: Uterus and adnexa within normal limits.  BOWEL: Small bowel anastomoses. No bowel obstruction. Appendix is within normal limits. PERITONEUM: No ascites. VESSELS: Within normal limits. RETROPERITONEUM/LYMPH NODES: No lymphadenopathy. ABDOMINAL WALL: Tiny fat-containing umbilical hernia. BONES: Within normal limits.  IMPRESSION: Etiology of abdominal pain is not elucidated.    --- End of Report ---      DIVYA BARNES MD; Attending Radiologist This document has been electronically signed. May 3 2024 1:29PM

## 2024-09-09 NOTE — PHYSICAL EXAM
[No Rash or Lesion] : No rash or lesion [Alert] : alert [Calm] : calm [de-identified] : Sitting comfortably, no acute distress [de-identified] : Normocephalic, atraumatic. Anicteric. [de-identified] : Supple, no obvious palpable masses or lymphadenopathy noted [de-identified] : Equal chest rise, nonlabored respirations. No audible wheezing. [de-identified] : Regular rate and rhythm. [de-identified] : Soft, nondistended, nontender.  Mild discomfort in left mid abdomen during palpation. Well-healed laparoscopic incisions.

## 2024-09-09 NOTE — HISTORY OF PRESENT ILLNESS
[de-identified] : 38-year-old female h/o hospitalization for SBO secondary to stricture associated with Meckel's diverticulum. Patient underwent a laparoscopic small bowel resection 4/26/23 and recovered well postoperatively. She presents for follow-up  Patient notes a bloating/pressure-like feeling in her left hemiabdomen associated with constipation. She has been following up with GI and has tried different medication Trulance with help but if she takes daily she has diarrhea.  Drinks plenty of water, tried papaya, plums, Metamucil - nothing seems to help Occasionally has nausea ,but no vomiting. She had these symptoms prior to surgery but seems they have worsened post operatively when she returned to work (6 weeks post op). She has undergone endoscopy and colonoscopy. She also has had a recent CT scan without evidence of intra-abdominal cause of her symptoms

## 2025-04-09 ENCOUNTER — APPOINTMENT (OUTPATIENT)
Dept: BARIATRICS | Facility: CLINIC | Age: 40
End: 2025-04-09
Payer: COMMERCIAL

## 2025-04-09 VITALS
HEIGHT: 62 IN | TEMPERATURE: 97.2 F | WEIGHT: 144.13 LBS | DIASTOLIC BLOOD PRESSURE: 72 MMHG | SYSTOLIC BLOOD PRESSURE: 112 MMHG | HEART RATE: 71 BPM | OXYGEN SATURATION: 98 % | BODY MASS INDEX: 26.52 KG/M2

## 2025-04-09 DIAGNOSIS — R10.9 UNSPECIFIED ABDOMINAL PAIN: ICD-10-CM

## 2025-04-09 DIAGNOSIS — K59.00 CONSTIPATION, UNSPECIFIED: ICD-10-CM

## 2025-04-09 PROCEDURE — 99213 OFFICE O/P EST LOW 20 MIN: CPT

## 2025-04-09 RX ORDER — LORATADINE 10 MG
17 TABLET,DISINTEGRATING ORAL
Refills: 0 | Status: ACTIVE | COMMUNITY

## 2025-04-14 ENCOUNTER — RESULT REVIEW (OUTPATIENT)
Age: 40
End: 2025-04-14

## 2025-04-18 ENCOUNTER — OUTPATIENT (OUTPATIENT)
Dept: OUTPATIENT SERVICES | Facility: HOSPITAL | Age: 40
LOS: 1 days | End: 2025-04-18
Payer: MEDICAID

## 2025-04-18 ENCOUNTER — APPOINTMENT (OUTPATIENT)
Dept: CT IMAGING | Facility: CLINIC | Age: 40
End: 2025-04-18

## 2025-04-18 DIAGNOSIS — K59.00 CONSTIPATION, UNSPECIFIED: ICD-10-CM

## 2025-04-18 DIAGNOSIS — Z90.49 ACQUIRED ABSENCE OF OTHER SPECIFIED PARTS OF DIGESTIVE TRACT: Chronic | ICD-10-CM

## 2025-04-18 PROCEDURE — 74177 CT ABD & PELVIS W/CONTRAST: CPT | Mod: 26

## 2025-04-18 PROCEDURE — 74177 CT ABD & PELVIS W/CONTRAST: CPT

## 2025-05-06 DIAGNOSIS — D21.9 BENIGN NEOPLASM OF CONNECTIVE AND OTHER SOFT TISSUE, UNSPECIFIED: ICD-10-CM

## 2025-09-03 DIAGNOSIS — Z12.39 ENCOUNTER FOR OTHER SCREENING FOR MALIGNANT NEOPLASM OF BREAST: ICD-10-CM

## 2025-09-03 DIAGNOSIS — Z12.11 ENCOUNTER FOR SCREENING FOR MALIGNANT NEOPLASM OF COLON: ICD-10-CM

## 2025-09-03 DIAGNOSIS — Z01.411 ENCOUNTER FOR GYNECOLOGICAL EXAMINATION (GENERAL) (ROUTINE) WITH ABNORMAL FINDINGS: ICD-10-CM

## 2025-09-08 ENCOUNTER — APPOINTMENT (OUTPATIENT)
Dept: OBGYN | Facility: CLINIC | Age: 40
End: 2025-09-08
Payer: COMMERCIAL

## 2025-09-08 ENCOUNTER — TRANSCRIPTION ENCOUNTER (OUTPATIENT)
Age: 40
End: 2025-09-08

## 2025-09-08 VITALS
DIASTOLIC BLOOD PRESSURE: 70 MMHG | RESPIRATION RATE: 14 BRPM | OXYGEN SATURATION: 98 % | BODY MASS INDEX: 25.76 KG/M2 | HEIGHT: 62 IN | HEART RATE: 91 BPM | WEIGHT: 140 LBS | SYSTOLIC BLOOD PRESSURE: 110 MMHG

## 2025-09-08 DIAGNOSIS — Z12.4 ENCOUNTER FOR SCREENING FOR MALIGNANT NEOPLASM OF CERVIX: ICD-10-CM

## 2025-09-08 DIAGNOSIS — D21.9 BENIGN NEOPLASM OF CONNECTIVE AND OTHER SOFT TISSUE, UNSPECIFIED: ICD-10-CM

## 2025-09-08 DIAGNOSIS — Z12.39 ENCOUNTER FOR OTHER SCREENING FOR MALIGNANT NEOPLASM OF BREAST: ICD-10-CM

## 2025-09-08 DIAGNOSIS — Z13.31 ENCOUNTER FOR SCREENING FOR DEPRESSION: ICD-10-CM

## 2025-09-08 DIAGNOSIS — Z12.31 ENCOUNTER FOR SCREENING MAMMOGRAM FOR MALIGNANT NEOPLASM OF BREAST: ICD-10-CM

## 2025-09-08 DIAGNOSIS — R10.9 UNSPECIFIED ABDOMINAL PAIN: ICD-10-CM

## 2025-09-08 DIAGNOSIS — N92.0 EXCESSIVE AND FREQUENT MENSTRUATION WITH REGULAR CYCLE: ICD-10-CM

## 2025-09-08 DIAGNOSIS — B37.49 OTHER UROGENITAL CANDIDIASIS: ICD-10-CM

## 2025-09-08 LAB
HCG UR QL: NEGATIVE
QUALITY CONTROL: YES

## 2025-09-08 PROCEDURE — 81025 URINE PREGNANCY TEST: CPT

## 2025-09-08 PROCEDURE — 99386 PREV VISIT NEW AGE 40-64: CPT | Mod: 25

## 2025-09-08 PROCEDURE — ZZZZZ: CPT

## 2025-09-08 PROCEDURE — 76830 TRANSVAGINAL US NON-OB: CPT

## 2025-09-08 RX ORDER — NORETHINDRONE ACETATE AND ETHINYL ESTRADIOL 1; 20 MG/1; UG/1
1-20 TABLET ORAL
Qty: 6 | Refills: 0 | Status: ACTIVE | COMMUNITY
Start: 2025-09-08 | End: 1900-01-01

## 2025-09-08 RX ORDER — KETOCONAZOLE 20 MG/G
2 CREAM TOPICAL TWICE DAILY
Qty: 1 | Refills: 0 | Status: ACTIVE | COMMUNITY
Start: 2025-09-08 | End: 1900-01-01

## 2025-09-08 RX ORDER — LEVOTHYROXINE SODIUM 88 UG/1
88 TABLET ORAL
Refills: 0 | Status: DISCONTINUED | COMMUNITY

## 2025-09-08 RX ORDER — TERCONAZOLE 8 MG/G
0.8 CREAM VAGINAL
Qty: 1 | Refills: 1 | Status: COMPLETED | COMMUNITY
Start: 2025-09-08 | End: 2025-09-14

## 2025-09-09 LAB — HPV HIGH+LOW RISK DNA PNL CVX: NOT DETECTED

## 2025-09-10 ENCOUNTER — APPOINTMENT (OUTPATIENT)
Dept: OTOLARYNGOLOGY | Facility: CLINIC | Age: 40
End: 2025-09-10
Payer: COMMERCIAL

## 2025-09-10 VITALS
HEIGHT: 62 IN | DIASTOLIC BLOOD PRESSURE: 67 MMHG | BODY MASS INDEX: 26.13 KG/M2 | HEART RATE: 94 BPM | SYSTOLIC BLOOD PRESSURE: 100 MMHG | WEIGHT: 142 LBS

## 2025-09-10 DIAGNOSIS — N76.0 ACUTE VAGINITIS: ICD-10-CM

## 2025-09-10 DIAGNOSIS — Z86.39 PERSONAL HISTORY OF OTHER ENDOCRINE, NUTRITIONAL AND METABOLIC DISEASE: ICD-10-CM

## 2025-09-10 DIAGNOSIS — H93.12 TINNITUS, LEFT EAR: ICD-10-CM

## 2025-09-10 DIAGNOSIS — H74.03 TYMPANOSCLEROSIS, BILATERAL: ICD-10-CM

## 2025-09-10 DIAGNOSIS — H91.8X3 OTHER SPECIFIED HEARING LOSS, BILATERAL: ICD-10-CM

## 2025-09-10 DIAGNOSIS — K12.1 OTHER FORMS OF STOMATITIS: ICD-10-CM

## 2025-09-10 DIAGNOSIS — J31.0 CHRONIC RHINITIS: ICD-10-CM

## 2025-09-10 DIAGNOSIS — H92.03 OTALGIA, BILATERAL: ICD-10-CM

## 2025-09-10 DIAGNOSIS — M26.622 ARTHRALGIA OF LEFT TEMPOROMANDIBULAR JOINT: ICD-10-CM

## 2025-09-10 DIAGNOSIS — Z82.2 FAMILY HISTORY OF DEAFNESS AND HEARING LOSS: ICD-10-CM

## 2025-09-10 DIAGNOSIS — B96.89 ACUTE VAGINITIS: ICD-10-CM

## 2025-09-10 DIAGNOSIS — H93.13 TINNITUS, BILATERAL: ICD-10-CM

## 2025-09-10 DIAGNOSIS — K12.30 OTHER FORMS OF STOMATITIS: ICD-10-CM

## 2025-09-10 DIAGNOSIS — J34.2 DEVIATED NASAL SEPTUM: ICD-10-CM

## 2025-09-10 LAB
BV BACTERIA RRNA VAG QL NAA+PROBE: DETECTED
C GLABRATA RNA VAG QL NAA+PROBE: NOT DETECTED
CANDIDA RRNA VAG QL PROBE: DETECTED
T VAGINALIS RRNA SPEC QL NAA+PROBE: NOT DETECTED

## 2025-09-10 PROCEDURE — 92557 COMPREHENSIVE HEARING TEST: CPT

## 2025-09-10 PROCEDURE — 99204 OFFICE O/P NEW MOD 45 MIN: CPT | Mod: 25

## 2025-09-10 PROCEDURE — 92570 ACOUSTIC IMMITANCE TESTING: CPT

## 2025-09-10 RX ORDER — LEVOTHYROXINE SODIUM 88 UG/1
88 TABLET ORAL
Refills: 0 | Status: ACTIVE | COMMUNITY

## 2025-09-10 RX ORDER — METRONIDAZOLE 7.5 MG/G
0.75 GEL VAGINAL
Qty: 1 | Refills: 1 | Status: ACTIVE | COMMUNITY
Start: 2025-09-10 | End: 1900-01-01

## 2025-09-11 ENCOUNTER — NON-APPOINTMENT (OUTPATIENT)
Age: 40
End: 2025-09-11

## 2025-09-11 ENCOUNTER — APPOINTMENT (OUTPATIENT)
Dept: OTOLARYNGOLOGY | Facility: CLINIC | Age: 40
End: 2025-09-11
Payer: COMMERCIAL

## 2025-09-11 LAB — CYTOLOGY CVX/VAG DOC THIN PREP: ABNORMAL

## 2025-09-11 PROCEDURE — 92653 AEP NEURODIAGNOSTIC I&R: CPT

## 2025-09-12 DIAGNOSIS — R87.612 LOW GRADE SQUAMOUS INTRAEPITHELIAL LESION ON CYTOLOGIC SMEAR OF CERVIX (LGSIL): ICD-10-CM

## 2025-09-20 ENCOUNTER — APPOINTMENT (OUTPATIENT)
Dept: CT IMAGING | Facility: CLINIC | Age: 40
End: 2025-09-20
Payer: COMMERCIAL

## 2025-09-20 ENCOUNTER — APPOINTMENT (OUTPATIENT)
Dept: ULTRASOUND IMAGING | Facility: CLINIC | Age: 40
End: 2025-09-20
Payer: COMMERCIAL

## 2025-09-20 ENCOUNTER — RESULT REVIEW (OUTPATIENT)
Age: 40
End: 2025-09-20

## 2025-09-20 ENCOUNTER — APPOINTMENT (OUTPATIENT)
Dept: MAMMOGRAPHY | Facility: CLINIC | Age: 40
End: 2025-09-20
Payer: COMMERCIAL

## 2025-09-20 PROCEDURE — 76641 ULTRASOUND BREAST COMPLETE: CPT | Mod: 26,50

## 2025-09-20 PROCEDURE — 77063 BREAST TOMOSYNTHESIS BI: CPT | Mod: 26

## 2025-09-20 PROCEDURE — 77067 SCR MAMMO BI INCL CAD: CPT | Mod: 26

## (undated) DEVICE — TUBING SUCTION 20FT

## (undated) DEVICE — DRSG CURITY GAUZE SPONGE 4 X 4" 12-PLY

## (undated) DEVICE — SUT SOFSILK 3-0 30" TIES

## (undated) DEVICE — Device

## (undated) DEVICE — DRAPE 3/4 SHEET W REINFORCEMENT 56X77"

## (undated) DEVICE — ELCTR BOVIE PENCIL BLADE 10FT

## (undated) DEVICE — STAPLER COVIDIEN ENDO GIA STANDARD HANDLE

## (undated) DEVICE — SOL IRR BAG NS 0.9% 3000ML

## (undated) DEVICE — SUT SOFSILK 3-0 30" V-20

## (undated) DEVICE — POUCH LIQUID LG

## (undated) DEVICE — SUT PDS II 1 48" TP-1

## (undated) DEVICE — FOLEY CATH 3-WAY 20FR 30CC LATEX LUBRICATH

## (undated) DEVICE — SUT SOFSILK 2-0 18" C-15

## (undated) DEVICE — FOLEY TRAY 14FR 5CC LTX UMETER CLOSED

## (undated) DEVICE — LIGASURE MARYLAND 37CM

## (undated) DEVICE — WARMING BLANKET UPPER ADULT

## (undated) DEVICE — GOWN SMARTGOWN RAGLAN XLG

## (undated) DEVICE — ELCTR BOVIE TIP BLADE INSULATED 2.75" EDGE

## (undated) DEVICE — SYR ASEPTO

## (undated) DEVICE — SUT BIOSYN 4-0 27" P-12

## (undated) DEVICE — DRAPE TOWEL BLUE 17" X 24"

## (undated) DEVICE — TROCAR ETHICON ENDOPATH XCEL BLADELESS 5MM X 100MM STABILITY

## (undated) DEVICE — VENODYNE/SCD SLEEVE CALF BARIATRIC

## (undated) DEVICE — LIGASURE IMPACT

## (undated) DEVICE — SUT SOFSILK 3-0 18" V-20 (POP-OFF)

## (undated) DEVICE — BLADE SCALPEL SAFETYLOCK #15

## (undated) DEVICE — D HELP - CLEARVIEW CLEARIFY SYSTEM

## (undated) DEVICE — SUT POLYSORB 3-0 30" V-20 UNDYED

## (undated) DEVICE — DRSG TEGADERM 4X4.75"

## (undated) DEVICE — TUBING STRYKEFLOW II SUCTION / IRRIGATOR

## (undated) DEVICE — SUT POLYSORB 2-0 30" V-20 UNDYED

## (undated) DEVICE — GELPORT LAPAROSCOPIC SYSTEM

## (undated) DEVICE — SUT POLYSORB 2-0 60" REEL

## (undated) DEVICE — VENODYNE/SCD SLEEVE CALF LARGE

## (undated) DEVICE — STAPLER SKIN PROXIMATE

## (undated) DEVICE — SUT SOFSILK 2-0 18" TIES

## (undated) DEVICE — PACK GENERAL LAPAROSCOPY

## (undated) DEVICE — DRAPE MAYO STAND 23"

## (undated) DEVICE — LIGASURE ATLAS 10MM 20CM

## (undated) DEVICE — TUBING STRYKER PNEUMOSURE HI FLOW INSUFFLATOR

## (undated) DEVICE — SOL IRR POUR NS 0.9% 1000ML

## (undated) DEVICE — GOWN LG

## (undated) DEVICE — STAPLER SKIN VISI-STAT 35 WIDE

## (undated) DEVICE — SUCTION YANKAUER TAPERED BULBOUS NO VENT

## (undated) DEVICE — VENODYNE/SCD SLEEVE CALF MEDIUM

## (undated) DEVICE — DRSG TEGADERM 2.5X3"